# Patient Record
Sex: FEMALE | Race: WHITE | NOT HISPANIC OR LATINO | Employment: OTHER | URBAN - METROPOLITAN AREA
[De-identification: names, ages, dates, MRNs, and addresses within clinical notes are randomized per-mention and may not be internally consistent; named-entity substitution may affect disease eponyms.]

---

## 2024-03-19 ENCOUNTER — APPOINTMENT (OUTPATIENT)
Dept: RADIOLOGY | Facility: HOSPITAL | Age: 54
End: 2024-03-19
Payer: COMMERCIAL

## 2024-03-28 ENCOUNTER — HOSPITAL ENCOUNTER (OUTPATIENT)
Dept: RADIOLOGY | Facility: HOSPITAL | Age: 54
Discharge: HOME | End: 2024-03-28
Payer: COMMERCIAL

## 2024-03-28 ENCOUNTER — HOSPITAL ENCOUNTER (EMERGENCY)
Facility: HOSPITAL | Age: 54
Discharge: HOME | End: 2024-03-29
Attending: STUDENT IN AN ORGANIZED HEALTH CARE EDUCATION/TRAINING PROGRAM
Payer: COMMERCIAL

## 2024-03-28 VITALS
SYSTOLIC BLOOD PRESSURE: 159 MMHG | OXYGEN SATURATION: 98 % | RESPIRATION RATE: 16 BRPM | DIASTOLIC BLOOD PRESSURE: 87 MMHG | TEMPERATURE: 98.4 F | HEART RATE: 80 BPM

## 2024-03-28 DIAGNOSIS — M54.12 RADICULOPATHY, CERVICAL REGION: ICD-10-CM

## 2024-03-28 DIAGNOSIS — M50.30 OTHER CERVICAL DISC DEGENERATION, UNSPECIFIED CERVICAL REGION: ICD-10-CM

## 2024-03-28 DIAGNOSIS — R10.9 ABDOMINAL PAIN, UNSPECIFIED ABDOMINAL LOCATION: Primary | ICD-10-CM

## 2024-03-28 LAB
ALBUMIN SERPL BCP-MCNC: 5.2 G/DL (ref 3.4–5)
ALP SERPL-CCNC: 64 U/L (ref 33–110)
ALT SERPL W P-5'-P-CCNC: 49 U/L (ref 7–45)
ANION GAP BLDV CALCULATED.4IONS-SCNC: 14 MMOL/L (ref 10–25)
ANION GAP SERPL CALC-SCNC: 15 MMOL/L (ref 10–20)
AST SERPL W P-5'-P-CCNC: 26 U/L (ref 9–39)
BASE EXCESS BLDV CALC-SCNC: 1.7 MMOL/L (ref -2–3)
BASOPHILS # BLD AUTO: 0.04 X10*3/UL (ref 0–0.1)
BASOPHILS NFR BLD AUTO: 0.4 %
BILIRUB SERPL-MCNC: 0.6 MG/DL (ref 0–1.2)
BODY TEMPERATURE: 37 DEGREES CELSIUS
BUN SERPL-MCNC: 11 MG/DL (ref 6–23)
CA-I BLDV-SCNC: 1.25 MMOL/L (ref 1.1–1.33)
CALCIUM SERPL-MCNC: 10.1 MG/DL (ref 8.6–10.6)
CHLORIDE BLDV-SCNC: 103 MMOL/L (ref 98–107)
CHLORIDE SERPL-SCNC: 104 MMOL/L (ref 98–107)
CO2 SERPL-SCNC: 26 MMOL/L (ref 21–32)
CREAT SERPL-MCNC: 0.59 MG/DL (ref 0.5–1.05)
EGFRCR SERPLBLD CKD-EPI 2021: >90 ML/MIN/1.73M*2
EOSINOPHIL # BLD AUTO: 0.26 X10*3/UL (ref 0–0.7)
EOSINOPHIL NFR BLD AUTO: 2.7 %
ERYTHROCYTE [DISTWIDTH] IN BLOOD BY AUTOMATED COUNT: 13 % (ref 11.5–14.5)
GLUCOSE BLDV-MCNC: 95 MG/DL (ref 74–99)
GLUCOSE SERPL-MCNC: 93 MG/DL (ref 74–99)
HCO3 BLDV-SCNC: 27.2 MMOL/L (ref 22–26)
HCT VFR BLD AUTO: 38.6 % (ref 36–46)
HCT VFR BLD EST: 41 % (ref 36–46)
HGB BLD-MCNC: 13.1 G/DL (ref 12–16)
HGB BLDV-MCNC: 13.6 G/DL (ref 12–16)
IMM GRANULOCYTES # BLD AUTO: 0.02 X10*3/UL (ref 0–0.7)
IMM GRANULOCYTES NFR BLD AUTO: 0.2 % (ref 0–0.9)
INHALED O2 CONCENTRATION: 21 %
LACTATE BLDV-SCNC: 2.2 MMOL/L (ref 0.4–2)
LIPASE SERPL-CCNC: 48 U/L (ref 9–82)
LYMPHOCYTES # BLD AUTO: 4.42 X10*3/UL (ref 1.2–4.8)
LYMPHOCYTES NFR BLD AUTO: 46.5 %
MCH RBC QN AUTO: 29.2 PG (ref 26–34)
MCHC RBC AUTO-ENTMCNC: 33.9 G/DL (ref 32–36)
MCV RBC AUTO: 86 FL (ref 80–100)
MONOCYTES # BLD AUTO: 0.55 X10*3/UL (ref 0.1–1)
MONOCYTES NFR BLD AUTO: 5.8 %
NEUTROPHILS # BLD AUTO: 4.21 X10*3/UL (ref 1.2–7.7)
NEUTROPHILS NFR BLD AUTO: 44.4 %
NRBC BLD-RTO: 0 /100 WBCS (ref 0–0)
OXYHGB MFR BLDV: 52.3 % (ref 45–75)
PCO2 BLDV: 45 MM HG (ref 41–51)
PH BLDV: 7.39 PH (ref 7.33–7.43)
PLATELET # BLD AUTO: 300 X10*3/UL (ref 150–450)
PO2 BLDV: 37 MM HG (ref 35–45)
POTASSIUM BLDV-SCNC: 3.3 MMOL/L (ref 3.5–5.3)
POTASSIUM SERPL-SCNC: 3.2 MMOL/L (ref 3.5–5.3)
PROT SERPL-MCNC: 8.1 G/DL (ref 6.4–8.2)
RBC # BLD AUTO: 4.49 X10*6/UL (ref 4–5.2)
SAO2 % BLDV: 53 % (ref 45–75)
SODIUM BLDV-SCNC: 141 MMOL/L (ref 136–145)
SODIUM SERPL-SCNC: 142 MMOL/L (ref 136–145)
WBC # BLD AUTO: 9.5 X10*3/UL (ref 4.4–11.3)

## 2024-03-28 PROCEDURE — 2500000004 HC RX 250 GENERAL PHARMACY W/ HCPCS (ALT 636 FOR OP/ED): Performed by: STUDENT IN AN ORGANIZED HEALTH CARE EDUCATION/TRAINING PROGRAM

## 2024-03-28 PROCEDURE — 96375 TX/PRO/DX INJ NEW DRUG ADDON: CPT

## 2024-03-28 PROCEDURE — 81001 URINALYSIS AUTO W/SCOPE: CPT | Performed by: STUDENT IN AN ORGANIZED HEALTH CARE EDUCATION/TRAINING PROGRAM

## 2024-03-28 PROCEDURE — 85025 COMPLETE CBC W/AUTO DIFF WBC: CPT | Performed by: STUDENT IN AN ORGANIZED HEALTH CARE EDUCATION/TRAINING PROGRAM

## 2024-03-28 PROCEDURE — 2500000001 HC RX 250 WO HCPCS SELF ADMINISTERED DRUGS (ALT 637 FOR MEDICARE OP): Performed by: STUDENT IN AN ORGANIZED HEALTH CARE EDUCATION/TRAINING PROGRAM

## 2024-03-28 PROCEDURE — 72141 MRI NECK SPINE W/O DYE: CPT

## 2024-03-28 PROCEDURE — 96374 THER/PROPH/DIAG INJ IV PUSH: CPT

## 2024-03-28 PROCEDURE — 84132 ASSAY OF SERUM POTASSIUM: CPT | Performed by: STUDENT IN AN ORGANIZED HEALTH CARE EDUCATION/TRAINING PROGRAM

## 2024-03-28 PROCEDURE — 99285 EMERGENCY DEPT VISIT HI MDM: CPT | Performed by: STUDENT IN AN ORGANIZED HEALTH CARE EDUCATION/TRAINING PROGRAM

## 2024-03-28 PROCEDURE — 36415 COLL VENOUS BLD VENIPUNCTURE: CPT | Performed by: STUDENT IN AN ORGANIZED HEALTH CARE EDUCATION/TRAINING PROGRAM

## 2024-03-28 PROCEDURE — 72141 MRI NECK SPINE W/O DYE: CPT | Performed by: RADIOLOGY

## 2024-03-28 PROCEDURE — 83690 ASSAY OF LIPASE: CPT | Performed by: STUDENT IN AN ORGANIZED HEALTH CARE EDUCATION/TRAINING PROGRAM

## 2024-03-28 PROCEDURE — 99284 EMERGENCY DEPT VISIT MOD MDM: CPT

## 2024-03-28 RX ORDER — ALUMINUM HYDROXIDE, MAGNESIUM HYDROXIDE, AND SIMETHICONE 1200; 120; 1200 MG/30ML; MG/30ML; MG/30ML
30 SUSPENSION ORAL ONCE
Status: COMPLETED | OUTPATIENT
Start: 2024-03-28 | End: 2024-03-28

## 2024-03-28 RX ORDER — METHOCARBAMOL 100 MG/ML
1000 INJECTION, SOLUTION INTRAMUSCULAR; INTRAVENOUS ONCE
Status: COMPLETED | OUTPATIENT
Start: 2024-03-28 | End: 2024-03-28

## 2024-03-28 RX ORDER — MORPHINE SULFATE 4 MG/ML
4 INJECTION INTRAVENOUS ONCE
Status: COMPLETED | OUTPATIENT
Start: 2024-03-28 | End: 2024-03-28

## 2024-03-28 RX ORDER — LIDOCAINE HYDROCHLORIDE 20 MG/ML
15 SOLUTION OROPHARYNGEAL ONCE
Status: COMPLETED | OUTPATIENT
Start: 2024-03-28 | End: 2024-03-28

## 2024-03-28 RX ORDER — FAMOTIDINE 10 MG/ML
20 INJECTION INTRAVENOUS ONCE
Status: COMPLETED | OUTPATIENT
Start: 2024-03-28 | End: 2024-03-28

## 2024-03-28 RX ORDER — ONDANSETRON HYDROCHLORIDE 2 MG/ML
4 INJECTION, SOLUTION INTRAVENOUS ONCE
Status: COMPLETED | OUTPATIENT
Start: 2024-03-28 | End: 2024-03-28

## 2024-03-28 RX ORDER — DIPHENHYDRAMINE HYDROCHLORIDE 50 MG/ML
50 INJECTION INTRAMUSCULAR; INTRAVENOUS ONCE
Status: COMPLETED | OUTPATIENT
Start: 2024-03-29 | End: 2024-03-29

## 2024-03-28 RX ADMIN — LIDOCAINE HYDROCHLORIDE 15 ML: 20 SOLUTION ORAL at 23:07

## 2024-03-28 RX ADMIN — ALUMINUM HYDROXIDE, MAGNESIUM HYDROXIDE, AND DIMETHICONE 30 ML: 200; 20; 200 SUSPENSION ORAL at 23:07

## 2024-03-28 RX ADMIN — MORPHINE SULFATE 4 MG: 4 INJECTION, SOLUTION INTRAMUSCULAR; INTRAVENOUS at 23:08

## 2024-03-28 RX ADMIN — ONDANSETRON 4 MG: 2 INJECTION INTRAMUSCULAR; INTRAVENOUS at 23:08

## 2024-03-28 RX ADMIN — METHYLPREDNISOLONE SODIUM SUCCINATE 40 MG: 40 INJECTION, POWDER, FOR SOLUTION INTRAMUSCULAR; INTRAVENOUS at 23:08

## 2024-03-28 RX ADMIN — FAMOTIDINE 20 MG: 10 INJECTION INTRAVENOUS at 23:08

## 2024-03-28 RX ADMIN — METHOCARBAMOL 1000 MG: 1000 INJECTION, SOLUTION INTRAMUSCULAR; INTRAVENOUS at 23:07

## 2024-03-28 ASSESSMENT — COLUMBIA-SUICIDE SEVERITY RATING SCALE - C-SSRS
2. HAVE YOU ACTUALLY HAD ANY THOUGHTS OF KILLING YOURSELF?: NO
6. HAVE YOU EVER DONE ANYTHING, STARTED TO DO ANYTHING, OR PREPARED TO DO ANYTHING TO END YOUR LIFE?: NO
1. IN THE PAST MONTH, HAVE YOU WISHED YOU WERE DEAD OR WISHED YOU COULD GO TO SLEEP AND NOT WAKE UP?: NO

## 2024-03-28 NOTE — ED TRIAGE NOTES
Pt says she's had ongoing neck and back pain for months, had MRI outpt today, says feels like muscle cramping.

## 2024-03-29 ENCOUNTER — APPOINTMENT (OUTPATIENT)
Dept: RADIOLOGY | Facility: HOSPITAL | Age: 54
End: 2024-03-29
Payer: COMMERCIAL

## 2024-03-29 LAB
APPEARANCE UR: CLEAR
BILIRUB UR STRIP.AUTO-MCNC: NEGATIVE MG/DL
COLOR UR: ABNORMAL
GLUCOSE UR STRIP.AUTO-MCNC: NORMAL MG/DL
HOLD SPECIMEN: NORMAL
KETONES UR STRIP.AUTO-MCNC: NEGATIVE MG/DL
LEUKOCYTE ESTERASE UR QL STRIP.AUTO: ABNORMAL
MUCOUS THREADS #/AREA URNS AUTO: NORMAL /LPF
NITRITE UR QL STRIP.AUTO: NEGATIVE
PH UR STRIP.AUTO: 5.5 [PH]
PROT UR STRIP.AUTO-MCNC: NEGATIVE MG/DL
RBC # UR STRIP.AUTO: NEGATIVE /UL
RBC #/AREA URNS AUTO: NORMAL /HPF
SP GR UR STRIP.AUTO: 1.02
SQUAMOUS #/AREA URNS AUTO: NORMAL /HPF
UROBILINOGEN UR STRIP.AUTO-MCNC: NORMAL MG/DL
WBC #/AREA URNS AUTO: NORMAL /HPF

## 2024-03-29 PROCEDURE — 2500000005 HC RX 250 GENERAL PHARMACY W/O HCPCS: Performed by: STUDENT IN AN ORGANIZED HEALTH CARE EDUCATION/TRAINING PROGRAM

## 2024-03-29 PROCEDURE — 96376 TX/PRO/DX INJ SAME DRUG ADON: CPT

## 2024-03-29 PROCEDURE — 2550000001 HC RX 255 CONTRASTS: Performed by: EMERGENCY MEDICINE

## 2024-03-29 PROCEDURE — 2500000001 HC RX 250 WO HCPCS SELF ADMINISTERED DRUGS (ALT 637 FOR MEDICARE OP): Performed by: STUDENT IN AN ORGANIZED HEALTH CARE EDUCATION/TRAINING PROGRAM

## 2024-03-29 PROCEDURE — 74177 CT ABD & PELVIS W/CONTRAST: CPT

## 2024-03-29 PROCEDURE — 96375 TX/PRO/DX INJ NEW DRUG ADDON: CPT

## 2024-03-29 PROCEDURE — 74177 CT ABD & PELVIS W/CONTRAST: CPT | Performed by: STUDENT IN AN ORGANIZED HEALTH CARE EDUCATION/TRAINING PROGRAM

## 2024-03-29 PROCEDURE — 2500000004 HC RX 250 GENERAL PHARMACY W/ HCPCS (ALT 636 FOR OP/ED): Performed by: STUDENT IN AN ORGANIZED HEALTH CARE EDUCATION/TRAINING PROGRAM

## 2024-03-29 RX ORDER — POTASSIUM CHLORIDE 1.5 G/1.58G
40 POWDER, FOR SOLUTION ORAL ONCE
Status: COMPLETED | OUTPATIENT
Start: 2024-03-29 | End: 2024-03-29

## 2024-03-29 RX ORDER — ONDANSETRON 4 MG/1
4 TABLET, ORALLY DISINTEGRATING ORAL ONCE
Status: COMPLETED | OUTPATIENT
Start: 2024-03-29 | End: 2024-03-29

## 2024-03-29 RX ADMIN — ONDANSETRON 4 MG: 4 TABLET, ORALLY DISINTEGRATING ORAL at 03:00

## 2024-03-29 RX ADMIN — METHYLPREDNISOLONE SODIUM SUCCINATE 40 MG: 40 INJECTION, POWDER, FOR SOLUTION INTRAMUSCULAR; INTRAVENOUS at 02:49

## 2024-03-29 RX ADMIN — IOHEXOL 80 ML: 350 INJECTION, SOLUTION INTRAVENOUS at 04:03

## 2024-03-29 RX ADMIN — POTASSIUM CHLORIDE 40 MEQ: 1.5 POWDER, FOR SOLUTION ORAL at 02:48

## 2024-03-29 RX ADMIN — DIPHENHYDRAMINE HYDROCHLORIDE 50 MG: 50 INJECTION INTRAMUSCULAR; INTRAVENOUS at 02:49

## 2024-03-29 NOTE — ED PROVIDER NOTES
HPI   Chief Complaint   Patient presents with    Back Pain       HPI  The patient is a 53-year-old female with past medical history of chronic back and abdominal pain with known ventral hernia status post mesh repair with central defect who presents emergency department with multiple medical complaints.  First, patient has had worsening generalized abdominal pain, distention and nausea and vomiting over the last week or 2.  Vomit is nonbloody nonbilious.  She is passing gas and having bowel movements.  Additionally, patient has chronic neck and back pain.  No fevers, saddle anesthesia or urinary incontinence/retention.  No trauma.  No spinal manipulation recently or use of anticoagulation.  No IV drug use.  States that she has pain when walking and mild weakness due to pain but is still walking normally.  She underwent outpatient MRI of the cervical spine that has not been read yet.      PMH:as above.  Meds:reviewed in EMR.  PSH:reviewed in EMR.  allergies:reviewed in EMR.  social:Denies X3.  Family History: non-contributory to acute presentation.    A full 10 point Review of Systems was reviewed with the patient and is negative unless stated in the HPI.                  No data recorded                   Patient History   History reviewed. No pertinent past medical history.  Past Surgical History:   Procedure Laterality Date    OTHER SURGICAL HISTORY  02/23/2021    Gallbladder surgery     No family history on file.  Social History     Tobacco Use    Smoking status: Not on file    Smokeless tobacco: Not on file   Substance Use Topics    Alcohol use: Not on file    Drug use: Not on file       Physical Exam   ED Triage Vitals [03/28/24 1837]   Temperature Heart Rate Respirations BP   36.9 °C (98.4 °F) 80 16 159/87      Pulse Ox Temp Source Heart Rate Source Patient Position   98 % Temporal -- --      BP Location FiO2 (%)     -- --       Physical Exam    Physical Exam:    Appearance: Appears uncomfortable    Skin:  Intact,  dry skin, no lesions, rash, petechiae or purpura.     Eyes: PERRLA, EOMs intact,  No scleral injection. No scleral icterus.     ENT: Hearing grossly intact. External auditory canals patent. Nares patent, mucus membranes moist. Dentition without lesions.     Neck: Supple, without meningismus. Trachea at midline.     Pulmonary: Clear bilaterally with good chest wall excursion. No rales, rhonchi or wheezing. No accessory muscle use or stridor.    Cardiac: Normal S1, S2 without murmur, rub, gallop or extrasystole. No JVD, Carotids without bruits.    Abdomen: Abdomen soft however distended with generalized tenderness to palpation.  No rebound tenderness.  No CVA tenderness    Genitourinary: Exam deferred.    Musculoskeletal: There is no midline C, T or L-spine tenderness to palpation, step-offs or deformities.  There is left lumbar paraspinal muscle tenderness to palpation and spasming.  Straight leg test negative bilaterally.  Strength is 5 out of 5 and symmetric in bilateral upper and lower extremities.  Sensation light touch intact distally in all 4 extremities.    Neurological:  Moving all 4 extremities equally, no focal findings identified    Psychiatric: Appropriate mood and affect.     ED Course & MDM   Diagnoses as of 03/31/24 0552   Abdominal pain, unspecified abdominal location       Medical Decision Making  Patient is a 53-year-old female past medical history of ventral hernia repair with central failure of her mesh who presents emergency department with acute on chronic worsening of abdominal pain, nausea, vomiting and lower back pain due to muscle spasms.  She was hemodynamically stable apart from hypertension on arrival and afebrile.  She had soft abdomen however she had generalized abdominal tenderness and some distention.  Due to her significant surgical history and reported vomiting we ordered a CT scan of the abdomen pelvis IV contrast and labs.  Her labs were large without abnormality.  Also  sent urine.      Treated the patient with pain medication, antinausea medication and antispasmodics due to the fact that she had some left paraspinal muscle spasming and was describing symptoms consistent with muscle spasms.  Patient was signed out to incoming team providers pending CT scan as she needed contrast prep due to contrast allergy.  She was in stable condition at the time.      This patient was discussed with Dr. Rincon who agrees.      Enrique Jacques MD  Emergency Medicine, PGY3    Procedure  Procedures  ATTENDING NOTE for Moe Rincon MD:    ATTENDING ATTESTATION:  The patient was seen by the resident/fellow.  I have personally performed a substantive portion of the encounter.  I have seen and examined the patient; agree with the workup, evaluation, MDM, management and diagnosis.  The care plan has been discussed with the resident/fellow; I have reviewed the resident/fellow´s note and agree with the documented findings with the exception/addition of the following:    Patient is a 53-year-old with history of chronic back pain and abdominal pain with known ventral hernia who had mesh placed but it was complicated by defect presents with persistence of her diffuse abdominal pain and back pain.  She reports her back feels like it is spasming but denies any new numbness Jackson weakness inability urinate or defecate saddle anesthesia or IV drug use or fevers or chills.  She had a recent MRI of her C-spine earlier today without a read but when I reviewed I do not see any definitive evidence of cord compression syndrome.  I do not think there is an indication for T or L-spine MRI given she has no symptoms of cauda equina syndrome my suspicion for discitis epidural abscess and osteomyelitis of the vertebral column is low especially given the chronicity of her symptoms and lack of risk factors.  We did treat her with muscle relaxer to help her symptoms.  Her belly exam is benign but given her previous  procedures, we did want a CT to investigate further.  Blood work was also obtained.  It was overall reassuring aside from mild hypokalemia.  Will replace the potassium here.  Urine not consistent with UTI.  Final disposition is pending repeat evaluation after the CT.    ---------------------------------------------------------       Kevin Jacques MD  Resident  03/31/24 0556

## 2024-03-29 NOTE — PROGRESS NOTES
Patient was signed out to me by Dr. Jacques at approximately 0200. For full history, physical, and prior ED course, please see previous provider note prior to patient handoff. This is an addendum to the record.     MDM:  Kacie Antoine is a 53 y.o. female presenting to the ED due to back pain and abdominal pain.  She has a history of chronic back pain as well as a known ventral hernia with failure to repair.  At time of signout, the patient was pending CT scans.  Due to contrast allergy, 5-hour prep was initiated and pending completion prior to CTs.  During my shift, CT was done.  Personally reviewed the CT scan which did not show any acute pathology.  However, patient was adamant on leaving as she was dissatisfied with her experience in the emergency room.  It was explained to the patient the importance of staying for radiologist to read the CT however she was insistent that she would follow-up on the results from [facility] and return should a positive result come back.  As such, patient was discharged with return precautions.  Amenable to this plan.    Final diagnoses:   [R10.9] Abdominal pain, unspecified abdominal location       Disposition:  Discharge    Mira Varela MD   Emergency Medicine Resident, PGY3  [facility]    ED Course:  Diagnoses as of 03/30/24 2127   Abdominal pain, unspecified abdominal location       Procedure  Procedures

## 2024-04-01 ENCOUNTER — OFFICE VISIT (OUTPATIENT)
Dept: SURGERY | Facility: CLINIC | Age: 54
End: 2024-04-01
Payer: COMMERCIAL

## 2024-04-01 VITALS
HEIGHT: 64 IN | BODY MASS INDEX: 32.3 KG/M2 | SYSTOLIC BLOOD PRESSURE: 132 MMHG | WEIGHT: 189.2 LBS | DIASTOLIC BLOOD PRESSURE: 89 MMHG | OXYGEN SATURATION: 96 % | HEART RATE: 101 BPM

## 2024-04-01 DIAGNOSIS — R10.9 ABDOMINAL PAIN, UNSPECIFIED ABDOMINAL LOCATION: Primary | ICD-10-CM

## 2024-04-01 PROCEDURE — 99202 OFFICE O/P NEW SF 15 MIN: CPT | Performed by: SURGERY

## 2024-04-01 RX ORDER — METHOCARBAMOL 500 MG/1
TABLET, FILM COATED ORAL EVERY 6 HOURS
COMMUNITY
Start: 2024-03-29

## 2024-04-01 NOTE — PROGRESS NOTES
"Subjective   Patient ID: Kacie Antoine is a 53 y.o. female who presents for New Patient Visit (Patient is a self referral for possible hernia. Patient states that she has had 3 previous hernia surgeries. Patient states that she has burning, pulling, pushing, swelling. Patient states that when she walks she feels pulling. Patient states that she has been hurting on her left side. Patient has been having diarrhea and constipation. Patient states that she gets stuck in certain positions at times. Patient states that the cramping she gets has labored her breathing. ).    Chief Complaint   Patient presents with    New Patient Visit     Patient is a self referral for possible hernia. Patient states that she has had 3 previous hernia surgeries. Patient states that she has burning, pulling, pushing, swelling. Patient states that when she walks she feels pulling. Patient states that she has been hurting on her left side. Patient has been having diarrhea and constipation. Patient states that she gets stuck in certain positions at times. Patient states that the cramping she gets has labored her breathing.        History of Presenting Illness:  53F with history significant for previous ventral hernia repairs presents with bilateral abdominal wall \"pulling sensation\" progressively worsening for the past 3 months.  Discomfort was previously on the R side but is now bilaterally.  Patient also reports problems between intermittent constipation and intermittent diarrhoea where she can go 3x times in a day with loose bowel movements - she states that she last had a colonoscopy in 2019 where there were polyps removed which were benign.    Her history is noted for having undergone an open abdominal wall reconstruction with mesh and bilateral posterior component separation and a FB (mesh+suture from previous repairs) removal performed by Dr.Ryan Velásquez in 2019.  Prior to that, she had two previous attempts at abdominal wall " reconstructions with transverses abdominis muscle release.  She had been doing well since the surgery and the present issues had only got worse in the last 3 months.      Review of Systems:  Constitutional: Denies changes in weight, fatigue, night-sweats  HEENT: No changes in vision, nasal drainage, headache  CV: No palpitations, no chest pain, no lower extremity oedema  Resp: No wheezing, no cough, no shortness of breath  GI: No nausea, vomiting, +diarrhoea, +constipation  : No dysuria, no increased frequency  Neuro: No weakness, confusion, numbness, dizziness  MSK: No weakness, arthralgias, myalgias  Haem: No easy bruising, no easy bleeding  Skin: No new lesions or rashes  Endocrine: No polydipsia, polyuria, heat/cold insensitivity    History reviewed. No pertinent past medical history.    Past Surgical History:   Procedure Laterality Date    EXPLORATORY LAPAROTOMY      OTHER SURGICAL HISTORY  02/23/2021    Gallbladder surgery    VENTRAL HERNIA REPAIR         Current Outpatient Medications on File Prior to Visit   Medication Sig Dispense Refill    methocarbamol (Robaxin) 500 mg tablet every 6 hours.       No current facility-administered medications on file prior to visit.        Allergies   Allergen Reactions    Hydromorphone Itching and Swelling    Sulfamethoxazole Unknown    Codeine Rash    Erythromycin Rash     Stomach pain    Iodinated Contrast Media Rash    Iodine Rash    Penicillins Rash and Unknown    Shellfish Derived Rash       Social History     Socioeconomic History    Marital status: Single     Spouse name: Not on file    Number of children: Not on file    Years of education: Not on file    Highest education level: Not on file   Occupational History    Not on file   Tobacco Use    Smoking status: Never    Smokeless tobacco: Never   Substance and Sexual Activity    Alcohol use: Never    Drug use: Never    Sexual activity: Not on file   Other Topics Concern    Not on file   Social History Narrative     "Not on file     Social Determinants of Health     Financial Resource Strain: Not on file   Food Insecurity: Not on file   Transportation Needs: Not on file   Physical Activity: Not on file   Stress: Not on file   Social Connections: Not on file   Intimate Partner Violence: Not on file   Housing Stability: Not on file       No family history on file.    Objective   /89   Pulse 101   Ht 1.626 m (5' 4\")   Wt 85.8 kg (189 lb 3.2 oz)   SpO2 96%   BMI 32.48 kg/m²     Physical Exam  Vitals reviewed.   Abdominal:      General: Abdomen is flat.      Palpations: Abdomen is soft.      Comments: No discernible recurrence of hernia(s) on abdominal exam  Well-healed surgical scars  Discomfort on deep palpation of bilateral abdomen, no guarding, no rebound   Musculoskeletal:         General: Normal range of motion.   Skin:     General: Skin is warm.      Capillary Refill: Capillary refill takes less than 2 seconds.   Neurological:      General: No focal deficit present.      Mental Status: She is alert.   Psychiatric:         Mood and Affect: Mood normal.       CT abdomen pelvis w IV contrast 03/29/2024    Narrative  Interpreted By:  Matt Troy and Stephens Katherine  STUDY:  CT ABDOMEN PELVIS W IV CONTRAST;  3/29/2024 3:59 am    INDICATION:  Signs/Symptoms:has ventral opening in mesh after repair of ventral  hernia, worsening abd pain, N&V.    COMPARISON:  CT abdomen pelvis 02/15/2021    ACCESSION NUMBER(S):  [id number]    ORDERING CLINICIAN:  RASHID BECK    TECHNIQUE:  CT of the abdomen and pelvis was performed.  Standard contiguous  axial images were obtained at 3 mm slice thickness through the  abdomen and pelvis. Coronal and sagittal reconstructions at 3 mm  slice thickness were performed.    80 ml of contrast Omnipaque 350 were administered intravenously  without immediate complication.    FINDINGS:  LOWER CHEST:  The visualized lung base is unremarkable. The heart is normal in size  without " pericardial effusion. No pleural effusion is present.  Visualized distal esophagus appears normal.    ABDOMEN:    LIVER:  The liver is enlarged and demonstrates diffusely decreased  attenuation consistent with hepatic steatosis. The liver is normal in  contour.    BILE DUCTS:  Intrahepatic and extrahepatic bile ducts are prominent which is  within normal limits given the status post cholecystectomy.    GALLBLADDER:  The gallbladder is surgically absent.    PANCREAS:  The pancreas appears unremarkable without evidence of ductal  dilatation or masses.    SPLEEN:  The spleen is enlarg within upper normal limits measuring 12.5 cm  craniocaudally similar to prior.    ADRENAL GLANDS:  Bilateral adrenal glands appear normal.    KIDNEYS AND URETERS:  The kidneys are normal in size and enhance symmetrically.  No  hydroureteronephrosis or nephroureterolithiasis is identified.    PELVIS:    BLADDER:  The urinary bladder appears normal without abnormal wall thickening.    REPRODUCTIVE ORGANS:  The uterus is present.    BOWEL:  The stomach is unremarkable.   The small and large bowel are normal  in caliber and demonstrate no wall thickening.   The appendix is  elongated along and extends anterior to the right hepatic lobe,  however it is normal. There is moderate amount of formed stool  throughout the colon without wall thickening or acute inflammatory  change. VESSELS:  There is no aneurysmal dilatation of the abdominal aorta. The IVC  appears normal.    PERITONEUM/RETROPERITONEUM/LYMPH NODES:  No ascites or free air, no fluid collection.  No abdominopelvic  lymphadenopathy is present.    BONES AND ABDOMINAL WALL:  No suspicious osseous lesions are identified. Degenerative discogenic  disease is noted in the lower thoracic and lumbar spine.  Postsurgical changes are noted of the ventral abdominal wall from  interval ventral abdominal wall hernia repair. There is ventral  abdominal wall scarring without evidence of recurrent  "hernia.    Impression  1.  No etiology of abdominal pain is evident.  2. Hepatomegaly with diffuse hepatic steatosis.  3. Postsurgical changes from ventral abdominal wall hernia repair. No  evidence of recurrent  hernia.    I personally reviewed the images/study and I agree with Emily Mcqueen DO's (radiology resident) findings as stated. This study  was interpreted at [place], [place].    MACRO:  None    Signed by: Matt Troy 3/29/2024 5:24 AM  Dictation workstation:   [id number]    Assessment/Plan   53F with history of previous abdominal wall reconstructions for VH with no discernible signs of recurrence on exam nor imaging but with bilateral \"tugging\" sensation which would most likely be secondary to the repair - no urgent intervention required at this time but will defer to hernia specialist.    - will refer to  at [facility]  Problem List Items Addressed This Visit    None  Visit Diagnoses         Codes    Abdominal pain, unspecified abdominal location    -  Primary R10.9    Relevant Orders    Referral to General Surgery                 Conrad Domínguez MD 04/01/24 9:54 AM   "

## 2024-04-03 ENCOUNTER — APPOINTMENT (OUTPATIENT)
Dept: RADIOLOGY | Facility: HOSPITAL | Age: 54
End: 2024-04-03
Payer: COMMERCIAL

## 2024-04-03 ENCOUNTER — TELEPHONE (OUTPATIENT)
Dept: SURGERY | Facility: CLINIC | Age: 54
End: 2024-04-03
Payer: COMMERCIAL

## 2024-04-03 NOTE — TELEPHONE ENCOUNTER
Patient called to get referral to a gastroenterologist since she's new to [facility]. She also inquires if  [facility]  accept Medical; [employer]?

## 2024-04-08 ENCOUNTER — OFFICE VISIT (OUTPATIENT)
Dept: ORTHOPEDIC SURGERY | Facility: CLINIC | Age: 54
End: 2024-04-08
Payer: COMMERCIAL

## 2024-04-08 VITALS — WEIGHT: 189 LBS | BODY MASS INDEX: 32.27 KG/M2 | HEIGHT: 64 IN

## 2024-04-08 DIAGNOSIS — M54.50 CHRONIC MIDLINE LOW BACK PAIN WITHOUT SCIATICA: Primary | ICD-10-CM

## 2024-04-08 DIAGNOSIS — M48.02 SPINAL STENOSIS OF CERVICAL REGION: ICD-10-CM

## 2024-04-08 DIAGNOSIS — G89.29 CHRONIC MIDLINE LOW BACK PAIN WITHOUT SCIATICA: Primary | ICD-10-CM

## 2024-04-08 PROCEDURE — 99204 OFFICE O/P NEW MOD 45 MIN: CPT | Performed by: PHYSICIAN ASSISTANT

## 2024-04-08 ASSESSMENT — PAIN - FUNCTIONAL ASSESSMENT: PAIN_FUNCTIONAL_ASSESSMENT: 0-10

## 2024-04-08 ASSESSMENT — PAIN DESCRIPTION - DESCRIPTORS: DESCRIPTORS: CRAMPING

## 2024-04-10 NOTE — PROGRESS NOTES
Kacie is a 53-year-old female reporting clinic today for evaluation of her neck and low back pain.    Her symptoms started approximately 3 months ago, she denies injury or trauma.  Her neck pain bothers her more than her back.  She has midline neck pain that radiates down her arms bilaterally.  She also feels her arms are weak.  She does have midline low back pain without radiation, specifically down her legs.  No recent change in her balance or fine motor skills.  She does admit to dropping things frequently.  No recent change in her bowel or bladder function.    She understands that some of her back pain could be coming from her weakened core, she has a complete abdominal mesh that was placed 2 years ago she basically has no abdominal muscles left.    Family, social, and medical histories are obtained and reviewed.    ROS: All other systems have been reviewed and are negative except as previously noted in history of present illness.    Physical Exam:  Const: Well-appearing, well-nourished female in no distress.  Eyes: Normal appearing sclera and conjunctiva, no jaundice, pupils normal in appearance.  Neck: No masses or lymphadenopathy appreciated.  Resp: breathing comfortably, normal respiratory rate rate.  CV: No upper or lower extremity edema.  Musculoskeletal: Normal gait.  Able to heel/toe walk without difficulty.  Cervical ROM is supple.  Strength exam of the upper and lower extremities reveals 5/5 strength in all major muscle groups.  No intrinsic wasting.  Negative Spurling sign.    Neuro: Sensation is intact and equal bilaterally. Deep tendon reflexes are normal and symmetric.  No clonus, positive Jamil sign, negative Lhermitte's sign  Skin: Intact without any lesions, normal turgor.  Psych: Alert and oriented x3, normal mood and affect.    I personally reviewed a MRI of the cervical spine completed on 3/28.  There is reversal of the normal cervical lordosis.  There is mild central canal stenosis at  C6-7 with severe bilateral foraminal stenosis.  There is significant foraminal stenosis bilaterally at C5-6 as well.    I had a long discussion with her about her symptoms and imaging results.  In regards to her low back pain without radiculopathy I recommend conservative treatment with aquatic therapy.  As for her neck pain with bilateral radiculopathy I do think she would be a candidate for surgical intervention.  She has not currently tried any conservative treatment.  She was given a referral for physical therapy and pain management to discuss possible injections.  If she does not have improvement after injection she can follow-up with myself or Dr. Leblanc to further discuss surgical intervention.    **This note was dictated using speech recognition software and was not corrected for spelling or grammatical errors**

## 2024-04-17 ENCOUNTER — APPOINTMENT (OUTPATIENT)
Dept: PAIN MEDICINE | Facility: CLINIC | Age: 54
End: 2024-04-17
Payer: COMMERCIAL

## 2024-04-23 ENCOUNTER — APPOINTMENT (OUTPATIENT)
Dept: SURGERY | Facility: HOSPITAL | Age: 54
End: 2024-04-23
Payer: COMMERCIAL

## 2024-04-24 ENCOUNTER — OFFICE VISIT (OUTPATIENT)
Dept: PAIN MEDICINE | Facility: CLINIC | Age: 54
End: 2024-04-24
Payer: COMMERCIAL

## 2024-04-24 DIAGNOSIS — M79.18 MYOFASCIAL PAIN: ICD-10-CM

## 2024-04-24 DIAGNOSIS — M54.12 BRACHIAL RADICULITIS: ICD-10-CM

## 2024-04-24 DIAGNOSIS — M47.812 SPONDYLOSIS OF CERVICAL REGION WITHOUT MYELOPATHY OR RADICULOPATHY: Primary | ICD-10-CM

## 2024-04-24 PROCEDURE — 99204 OFFICE O/P NEW MOD 45 MIN: CPT | Performed by: PHYSICAL MEDICINE & REHABILITATION

## 2024-04-24 NOTE — PROGRESS NOTES
Chief complaint  Neck and back pain     History  Kacie Antoine is presenting  for initial pain management office visit  The neck pain is worse. The back pain is in the back pain   Neck pain is the worst. That is radiating to the elbows and radial forearms bilaterally  Pain worse on the R side. The pain at the base of the neck is associated with deep stabbing sensation along with tight muscles limiting the range of motion of the neck mainly in flexion and sidebending.  This is associated with burning sensation going down the radial aspect of the right upper limb reaching the elbow, forearm, radial aspect of the right wrist into the first digital space along with the thumb and index.  The pain worsens with reaching overhead or with bending with the neck forward and to the side.  Rotation of the neck is limited by the tight muscles at the base of the neck and also by increased pain to the upper limb.  This is associated with weakness with the right elbow flexion along with weakened  and decreased manual dexterity with the right hand.  Occasionally dropping objects if not careful with handling using the right hand.  No reported difficulty with the gait and no trouble with bowel or bladder continence.    The symptoms appeared spontaneously without history intervening trauma or falls.   But 3 months ago the pain got worse after she got ill with the flu with vomiting and coughing    The pain is interfering with activities of daily living, quality of life and quality of sleep. It is limiting the functions and everything takes longer to complete because of the slowing related to the pain. Movements are cautious to avoid aggravation of the symptoms.       Pain level at rest    is 4/10 , with the aggravation ,  pain level 6 to 7/10.     Review of Systems     Denied any fever or chills. No weight loss and no night sweats. No cough or sputum production. No diarrhea   The constipation has been responding to fibers and over  the counter medications.     No bladder and bowel incontinence and no other changes in bladder and bowel. No skin changes.  Reports tiredness and fatigability only if the pain is not controlled.   Denied opioids diversion and abuse and denies alcoholism. Denies overuse of the pain medications.         Physical examination  Awake, alert and oriented for time place and persons   declined Chaperone for the visit and was adequately  draped for the exam.  Examination of the cervical spine showed tight muscle bands on the right side limiting the range of motion of the cervical spine in flexion and bending to the right side because that increases the pain.  Spurling's maneuver increased the pain at the base of the neck and down the right upper limb on the radial aspect of the arm reaching above the elbow, forearm and the first interdigital space including the thumb and index finger. Similar findings with cervical root tension sign on the right side with the pain reaching down to the thumb and index finger.   Decreased sensory to light touch on the radial aspect of the right forearm with the first interdigital webspace as well as the thumb and index finger.  Deep tendon reflexes showed decreased biceps and brachioradialis reflex.  The  triceps reflexes is  present.  Elbow flexion and supination are 4/5 on the right compared to 5/5 on the left.  The right  is slightly weaker compared to the left .  Tinel's sign over the median nerve at the wrist and ulnar nerve at the elbow are both negative.    No increased tendon reflexes in the lower limbs plantar cutaneous are downgoing bilaterally.        Similar findings on the left   plantar cutaneous reflex are down going bilaterally   No long tract signs to the lower limbs   She has mechanical back pain and wanted to handle the neck first.    Diagnosis  Problem List Items Addressed This Visit       Spondylosis of cervical region without myelopathy or radiculopathy - Primary     Brachial radiculitis    Relevant Orders    Epidural Steroid Injection    Myofascial pain        Plan  Reviewed the pain generators.  Went over the types of pain with neuropathic and nociceptive and different pathologies and therapeutic modalities. Discussed the mechanism of action of interventions from acupuncture, physical therapy , regular exercises, injections, botox, spinal cord stimulation, and role of surgery     Went over pathology of the intervertebral disc displacement and the anatomical relation to the Nerve roots and relation to the radicular symptoms. Went over treatment modalities with conservative treatment including acupuncture   and epidural steroid injection with fluoroscopy guidance and last resort of surgery    discussed with her about cervical spondylosis inducing Myofascial pain and radicular pain and willl address those     Consider Tune up ball for TP breakdown and myofascial pain     Start water therapy already referred to  Joel her abotu yong Risks not limited to infection, sepsis, abscess, bleeding , nerve injury, paralysis, and death...        Discussed about NSAIDS and I explained about the opioids sparing effect to allow keeping the opioids dose at minimal effective dose.   I went over the potential side effects of the NSAIDS on the gastrointestinal, renal and cardiovascular systems.      I detailed the side effects from the acetaminophen in the medication and made aware of those. I also explained about the cumulative effects on the organs and mainly the liver.    .     Follow-up after above  or earlier if needed     The level of clinical decision making in this office visit,  is high, given the high risks of complications with the morbidity and mortality due to the fact that acute and chronic pain may pose a threat to life and bodily function, if under treated, poorly treated, or with failure to maintain adequate treatment and timely medical follow up. Additionally over treatment has  its own set of complications including overdosing on the pain medications and also the habit forming potentials with the use of the medications used to treat chronic painful conditions including therapeutic classes classified as dangerous medications. Given the serious and fluctuating nature of pain level and instensity with extensive consideration for whenever pain changes, there is always the risk of prolonged functional impairment requiring close patient monitoring with regular assessments and reassessments and high level medical decision making at every office visit. The amount and complexity of data reviewed is high given the patient clinical presentation, labs,  data, radiology reports, and other tests as discussed during office visits. Pertinent data whether positive or negative were taken in consideration in the process of making this high level medical decision.

## 2024-04-25 ENCOUNTER — APPOINTMENT (OUTPATIENT)
Dept: RADIOLOGY | Facility: HOSPITAL | Age: 54
End: 2024-04-25
Payer: COMMERCIAL

## 2024-05-07 ENCOUNTER — OFFICE VISIT (OUTPATIENT)
Dept: SURGERY | Facility: HOSPITAL | Age: 54
End: 2024-05-07
Payer: COMMERCIAL

## 2024-05-07 ENCOUNTER — OFFICE VISIT (OUTPATIENT)
Dept: GASTROENTEROLOGY | Facility: HOSPITAL | Age: 54
End: 2024-05-07
Payer: COMMERCIAL

## 2024-05-07 VITALS
SYSTOLIC BLOOD PRESSURE: 131 MMHG | WEIGHT: 190 LBS | OXYGEN SATURATION: 97 % | TEMPERATURE: 97.8 F | HEIGHT: 64 IN | DIASTOLIC BLOOD PRESSURE: 86 MMHG | HEART RATE: 109 BPM | BODY MASS INDEX: 32.44 KG/M2

## 2024-05-07 VITALS
BODY MASS INDEX: 32.44 KG/M2 | SYSTOLIC BLOOD PRESSURE: 141 MMHG | DIASTOLIC BLOOD PRESSURE: 103 MMHG | HEART RATE: 113 BPM | WEIGHT: 189 LBS

## 2024-05-07 DIAGNOSIS — R13.19 ESOPHAGEAL DYSPHAGIA: ICD-10-CM

## 2024-05-07 DIAGNOSIS — R14.0 ABDOMINAL BLOATING: ICD-10-CM

## 2024-05-07 DIAGNOSIS — R14.0 ABDOMINAL DISTENSION: ICD-10-CM

## 2024-05-07 DIAGNOSIS — R10.9 ABDOMINAL PAIN, UNSPECIFIED ABDOMINAL LOCATION: ICD-10-CM

## 2024-05-07 DIAGNOSIS — R12 HEARTBURN: ICD-10-CM

## 2024-05-07 DIAGNOSIS — R11.2 NAUSEA AND VOMITING, UNSPECIFIED VOMITING TYPE: ICD-10-CM

## 2024-05-07 DIAGNOSIS — R43.2 DYSGEUSIA: ICD-10-CM

## 2024-05-07 DIAGNOSIS — D12.6 TUBULAR ADENOMA OF COLON: Primary | ICD-10-CM

## 2024-05-07 DIAGNOSIS — R19.8 ALTERNATING CONSTIPATION AND DIARRHEA: ICD-10-CM

## 2024-05-07 DIAGNOSIS — R68.81 EARLY SATIETY: ICD-10-CM

## 2024-05-07 PROCEDURE — 99204 OFFICE O/P NEW MOD 45 MIN: CPT | Performed by: NURSE PRACTITIONER

## 2024-05-07 PROCEDURE — 99214 OFFICE O/P EST MOD 30 MIN: CPT | Performed by: SURGERY

## 2024-05-07 PROCEDURE — 1036F TOBACCO NON-USER: CPT | Performed by: SURGERY

## 2024-05-07 PROCEDURE — 1036F TOBACCO NON-USER: CPT | Performed by: NURSE PRACTITIONER

## 2024-05-07 PROCEDURE — 99214 OFFICE O/P EST MOD 30 MIN: CPT | Performed by: NURSE PRACTITIONER

## 2024-05-07 RX ORDER — POLYETHYLENE GLYCOL 3350, SODIUM SULFATE ANHYDROUS, SODIUM BICARBONATE, SODIUM CHLORIDE, POTASSIUM CHLORIDE 236; 22.74; 6.74; 5.86; 2.97 G/4L; G/4L; G/4L; G/4L; G/4L
4000 POWDER, FOR SOLUTION ORAL ONCE
Qty: 4000 ML | Refills: 0 | Status: SHIPPED | OUTPATIENT
Start: 2024-05-07 | End: 2024-05-07

## 2024-05-07 RX ORDER — IBUPROFEN 400 MG/1
400 TABLET ORAL AS NEEDED
COMMUNITY

## 2024-05-07 RX ORDER — PANTOPRAZOLE SODIUM 20 MG/1
20 TABLET, DELAYED RELEASE ORAL
Qty: 30 TABLET | Refills: 5 | Status: SHIPPED | OUTPATIENT
Start: 2024-05-07

## 2024-05-07 ASSESSMENT — ENCOUNTER SYMPTOMS
DIAPHORESIS: 0
ADENOPATHY: 0
MYALGIAS: 0
NUMBNESS: 0
FATIGUE: 0
NERVOUS/ANXIOUS: 0
EYE PAIN: 0
LIGHT-HEADEDNESS: 0
DEPRESSION: 0
JOINT SWELLING: 0
HEMATURIA: 0
SORE THROAT: 0
FLANK PAIN: 0
FEVER: 0
SHORTNESS OF BREATH: 0
WHEEZING: 0
BACK PAIN: 0
HALLUCINATIONS: 0
PALPITATIONS: 0
PHOTOPHOBIA: 0
AGITATION: 0
COUGH: 0
CHILLS: 0
ARTHRALGIAS: 0
HEADACHES: 0
FREQUENCY: 0
WEAKNESS: 0
DIZZINESS: 0
DYSURIA: 0

## 2024-05-07 ASSESSMENT — PAIN SCALES - GENERAL: PAINLEVEL: 7

## 2024-05-07 NOTE — LETTER
May 7, 2024     Conrad Domínguez MD  [address] [address]    Patient: Kacie Antoine   YOB: 1970   Date of Visit: 5/7/2024       Dear Dr. Conrad Domínguez MD:    Thank you for referring Kacie Antoine to me for evaluation. Below are my notes for this consultation.  If you have questions, please do not hesitate to call me. I look forward to following your patient along with you.       Sincerely,     Clinton Davis MD      CC: Clayton Wayne Seiple, DO  ______________________________________________________________________________________    History Of Present Illness  Kacie Antoine is a 54 y.o. female presenting with long history of chronic abdominal pain especially with stretching reaching and swimming.  History of numerous prior surgical procedures including cholecystectomy, laparoscopic hernia repair and then she had a transversus abdominis release done at [facility] in 2019.  This hernia recurred secondary to midline disruption and some mild lunar line injury.  Eli from  then did a redo TAR via open approach with removal of old mesh, muscle debridement and placement of a 30 x 30 cm polypropylene mesh.  Most recently she had a CT scan done showing integrity of that repair.  She also has upcoming appointment with gastroenterology.     Past Medical History  No past medical history on file.    Surgical History  Past Surgical History:   Procedure Laterality Date   • EXPLORATORY LAPAROTOMY     • OTHER SURGICAL HISTORY  02/23/2021    Gallbladder surgery   • VENTRAL HERNIA REPAIR          Social History  She reports that she has never smoked. She has never used smokeless tobacco. She reports that she does not drink alcohol and does not use drugs.    Family History  No family history on file.     Allergies  Hydromorphone, Sulfamethoxazole, Codeine, Erythromycin, Iodinated contrast media, Iodine, Penicillins, and Shellfish derived    Review of  Systems  Constitutional: no weight loss, no fevers, no malaise  HEENT: negative  Neck: Chronic neck pain  Pulmonary: no SOB, no cough  CV: no chest pain, otherwise negative  GI: See HPI  : no hematuria, retention.  MS: no aches/pains  Neurologic: negative  Skin: no rashes, lesions  HEME: no bleeding tendency, no bruising  Psych: no mood issues    Physical Exam  General: well appearing, no acute distress, well nourished  HEENT: normal  Neck: supple  Pulmonary: lungs clear to auscultation bilaterally  CV: RR, S1S2, no murmurs.  Pulses palpable and equal.  Good capillary refill  Abdomen: soft, no peritoneal signs.  No clinical evidence of recurrent hernia  : grossly normal external genitalia  MS: grossly normal  Neurologic: alert and oriented, strength/sensation intact  Skin: non jaundiced, no lesions  Psych: mood appropriate    Last Recorded Vitals  There were no vitals taken for this visit.    Relevant Results  {If you would like to pull in Medications, type .meds     If you would like to pull in Lab results for the last 24 hours, type .[id number]    If you would like to pull in Imaging results, type .imgrslt :99}       Show images for CT abdomen pelvis w IV contrast  Signed by    Signed Time Phone Pager   Matt Troy DO 3/29/2024 05:24 [phone]     Exam Information    Status Exam Begun Exam Ended   Final 3/29/2024 03:56 3/29/2024 03:59     Study Result    Narrative & Impression   Interpreted By:  Matt Troy and Stephens Katherine   STUDY:  CT ABDOMEN PELVIS W IV CONTRAST;  3/29/2024 3:59 am      INDICATION:  Signs/Symptoms:has ventral opening in mesh after repair of ventral  hernia, worsening abd pain, N&V.      COMPARISON:  CT abdomen pelvis 02/15/2021      ACCESSION NUMBER(S):  [id number]      ORDERING CLINICIAN:  RASHID BECK      TECHNIQUE:  CT of the abdomen and pelvis was performed.  Standard contiguous  axial images were obtained at 3 mm slice thickness through the  abdomen and  pelvis. Coronal and sagittal reconstructions at 3 mm  slice thickness were performed.      80 ml of contrast Omnipaque 350 were administered intravenously  without immediate complication.      FINDINGS:  LOWER CHEST:  The visualized lung base is unremarkable. The heart is normal in size  without pericardial effusion. No pleural effusion is present.  Visualized distal esophagus appears normal.      ABDOMEN:      LIVER:  The liver is enlarged and demonstrates diffusely decreased  attenuation consistent with hepatic steatosis. The liver is normal in  contour.      BILE DUCTS:  Intrahepatic and extrahepatic bile ducts are prominent which is  within normal limits given the status post cholecystectomy.      GALLBLADDER:  The gallbladder is surgically absent.      PANCREAS:  The pancreas appears unremarkable without evidence of ductal  dilatation or masses.      SPLEEN:  The spleen is enlarg within upper normal limits measuring 12.5 cm  craniocaudally similar to prior.      ADRENAL GLANDS:  Bilateral adrenal glands appear normal.      KIDNEYS AND URETERS:  The kidneys are normal in size and enhance symmetrically.  No  hydroureteronephrosis or nephroureterolithiasis is identified.      PELVIS:      BLADDER:  The urinary bladder appears normal without abnormal wall thickening.      REPRODUCTIVE ORGANS:  The uterus is present.      BOWEL:  The stomach is unremarkable.   The small and large bowel are normal  in caliber and demonstrate no wall thickening.   The appendix is  elongated along and extends anterior to the right hepatic lobe,  however it is normal. There is moderate amount of formed stool  throughout the colon without wall thickening or acute inflammatory  change. VESSELS:  There is no aneurysmal dilatation of the abdominal aorta. The IVC  appears normal.      PERITONEUM/RETROPERITONEUM/LYMPH NODES:  No ascites or free air, no fluid collection.  No abdominopelvic  lymphadenopathy is present.      BONES AND ABDOMINAL  WALL:  No suspicious osseous lesions are identified. Degenerative discogenic  disease is noted in the lower thoracic and lumbar spine.  Postsurgical changes are noted of the ventral abdominal wall from  interval ventral abdominal wall hernia repair. There is ventral  abdominal wall scarring without evidence of recurrent hernia.      IMPRESSION:  1.  No etiology of abdominal pain is evident.  2. Hepatomegaly with diffuse hepatic steatosis.  3. Postsurgical changes from ventral abdominal wall hernia repair. No  evidence of recurrent  hernia.      I personally reviewed the images/study and I agree with Emily Mcqueen DO's (radiology resident) findings as stated. This study  was interpreted at [place], [place].      MACRO:  None      Signed by: Matt Troy 3/29/2024 5:24 AM  Dictation workstation:   [id number]        Assessment/Plan   {Assess/PlanSmartLinks:94916}    Patient with some chronic abdominal pain most likely scar tissue, musculoskeletal issues.  Her CT scan shows no clinical evidence of recurrence or fluid collection.  She is very concerned about resuming an exercise routine due to all her prior surgeries and I tried to reassure her today that the reasonable cardiovascular regimen could be commenced.  Additional surgery at this point would be a catastrophic care.  She finally has abdominal wall continuity and integrity given that she is 2 years out from surgery it is unlikely that light cardiovascular exercise routine would cause disruption.  She is free to contact me to discuss any changes in her clinical condition in the future.       I spent 45 minutes in the professional and overall care of this patient.      Clinton Davis MD

## 2024-05-07 NOTE — PROGRESS NOTES
History Of Present Illness  Kacie Antoine is a 54 y.o. female presenting with long history of chronic abdominal pain especially with stretching reaching and swimming.  History of numerous prior surgical procedures including cholecystectomy, laparoscopic hernia repair and then she had a transversus abdominis release done at [facility] in 2019.  This hernia recurred secondary to midline disruption and some mild lunar line injury.  Eli from  then did a redo TAR via open approach with removal of old mesh, muscle debridement and placement of a 30 x 30 cm polypropylene mesh.  Most recently she had a CT scan done showing integrity of that repair.  She also has upcoming appointment with gastroenterology.     Past Medical History  No past medical history on file.    Surgical History  Past Surgical History:   Procedure Laterality Date    EXPLORATORY LAPAROTOMY      OTHER SURGICAL HISTORY  02/23/2021    Gallbladder surgery    VENTRAL HERNIA REPAIR          Social History  She reports that she has never smoked. She has never used smokeless tobacco. She reports that she does not drink alcohol and does not use drugs.    Family History  No family history on file.     Allergies  Hydromorphone, Sulfamethoxazole, Codeine, Erythromycin, Iodinated contrast media, Iodine, Penicillins, and Shellfish derived    Review of Systems  Constitutional: no weight loss, no fevers, no malaise  HEENT: negative  Neck: Chronic neck pain  Pulmonary: no SOB, no cough  CV: no chest pain, otherwise negative  GI: See HPI  : no hematuria, retention.  MS: no aches/pains  Neurologic: negative  Skin: no rashes, lesions  HEME: no bleeding tendency, no bruising  Psych: no mood issues    Physical Exam  General: well appearing, no acute distress, well nourished  HEENT: normal  Neck: supple  Pulmonary: lungs clear to auscultation bilaterally  CV: RR, S1S2, no murmurs.  Pulses palpable and equal.  Good capillary refill  Abdomen: soft, no peritoneal  signs.  No clinical evidence of recurrent hernia  : grossly normal external genitalia  MS: grossly normal  Neurologic: alert and oriented, strength/sensation intact  Skin: non jaundiced, no lesions  Psych: mood appropriate    Last Recorded Vitals  There were no vitals taken for this visit.    Relevant Results         Show images for CT abdomen pelvis w IV contrast  Signed by    Signed Time Phone Pager   Matt Troy DO 3/29/2024 05:24 [phone]     Exam Information    Status Exam Begun Exam Ended   Final 3/29/2024 03:56 3/29/2024 03:59     Study Result    Narrative & Impression   Interpreted By:  Matt Troy and Stephens Katherine   STUDY:  CT ABDOMEN PELVIS W IV CONTRAST;  3/29/2024 3:59 am      INDICATION:  Signs/Symptoms:has ventral opening in mesh after repair of ventral  hernia, worsening abd pain, N&V.      COMPARISON:  CT abdomen pelvis 02/15/2021      ACCESSION NUMBER(S):  [id number]      ORDERING CLINICIAN:  RASHID BECK      TECHNIQUE:  CT of the abdomen and pelvis was performed.  Standard contiguous  axial images were obtained at 3 mm slice thickness through the  abdomen and pelvis. Coronal and sagittal reconstructions at 3 mm  slice thickness were performed.      80 ml of contrast Omnipaque 350 were administered intravenously  without immediate complication.      FINDINGS:  LOWER CHEST:  The visualized lung base is unremarkable. The heart is normal in size  without pericardial effusion. No pleural effusion is present.  Visualized distal esophagus appears normal.      ABDOMEN:      LIVER:  The liver is enlarged and demonstrates diffusely decreased  attenuation consistent with hepatic steatosis. The liver is normal in  contour.      BILE DUCTS:  Intrahepatic and extrahepatic bile ducts are prominent which is  within normal limits given the status post cholecystectomy.      GALLBLADDER:  The gallbladder is surgically absent.      PANCREAS:  The pancreas appears unremarkable without  evidence of ductal  dilatation or masses.      SPLEEN:  The spleen is enlarg within upper normal limits measuring 12.5 cm  craniocaudally similar to prior.      ADRENAL GLANDS:  Bilateral adrenal glands appear normal.      KIDNEYS AND URETERS:  The kidneys are normal in size and enhance symmetrically.  No  hydroureteronephrosis or nephroureterolithiasis is identified.      PELVIS:      BLADDER:  The urinary bladder appears normal without abnormal wall thickening.      REPRODUCTIVE ORGANS:  The uterus is present.      BOWEL:  The stomach is unremarkable.   The small and large bowel are normal  in caliber and demonstrate no wall thickening.   The appendix is  elongated along and extends anterior to the right hepatic lobe,  however it is normal. There is moderate amount of formed stool  throughout the colon without wall thickening or acute inflammatory  change. VESSELS:  There is no aneurysmal dilatation of the abdominal aorta. The IVC  appears normal.      PERITONEUM/RETROPERITONEUM/LYMPH NODES:  No ascites or free air, no fluid collection.  No abdominopelvic  lymphadenopathy is present.      BONES AND ABDOMINAL WALL:  No suspicious osseous lesions are identified. Degenerative discogenic  disease is noted in the lower thoracic and lumbar spine.  Postsurgical changes are noted of the ventral abdominal wall from  interval ventral abdominal wall hernia repair. There is ventral  abdominal wall scarring without evidence of recurrent hernia.      IMPRESSION:  1.  No etiology of abdominal pain is evident.  2. Hepatomegaly with diffuse hepatic steatosis.  3. Postsurgical changes from ventral abdominal wall hernia repair. No  evidence of recurrent  hernia.      I personally reviewed the images/study and I agree with Emily Mcqueen DO's (radiology resident) findings as stated. This study  was interpreted at [facility],  [place], [place].      MACRO:  None      Signed by: Matt Troy  3/29/2024 5:24 AM  Dictation workstation:   [id number]        Assessment/Plan       Patient with some chronic abdominal pain most likely scar tissue, musculoskeletal issues.  Her CT scan shows no clinical evidence of recurrence or fluid collection.  She is very concerned about resuming an exercise routine due to all her prior surgeries and I tried to reassure her today that the reasonable cardiovascular regimen could be commenced.  Additional surgery at this point would be a catastrophic care.  She finally has abdominal wall continuity and integrity given that she is 2 years out from surgery it is unlikely that light cardiovascular exercise routine would cause disruption.  She is free to contact me to discuss any changes in her clinical condition in the future.       I spent 45 minutes in the professional and overall care of this patient.      Clinton Davis MD

## 2024-05-07 NOTE — PATIENT INSTRUCTIONS
Thanks for coming to the GI clinic.     I would like you to get EGD.     You will be scheduled for a colonoscopy.   Please read all of the instructions 7 days before your colonoscopy.   You will need to take ONLY clear liquids the ENTIRE day before your procedure. These include (clear fruit juices, soda, Gatorade, broth, jello and coffee/tea) Avoid red and purple drinks. No cream or milk in the coffee.   You will need to take a bowel preparation.   You will also need a .      Please call [phone] to schedule the above procedures.     Start pantoprazole 20 mg once daily (30-60 minutes prior to breakfast).     Follow up 3 weeks after completion of the above.

## 2024-05-07 NOTE — PROGRESS NOTES
"Subjective   Patient ID: Kacie Antoine is a 54 y.o. female who presents for nausea and abdominal pain.    This is a 54 year old WF with history of chronic neck/back pain, multiple ventral hernia repairs,  and abdominal wall reconstruction who is presenting to the GI clinic for an initial visit.     History per pt and review of EMR including OSH records     Of note, on  3/28/24 she was seen in the [facility] ED with abdominal and back pain. CT A/P at that time was without any acute findings.     Report diffuse migratory abdominal pain which began in the midst of her hernia repairs.  She has dull diffuse abdominal pain which lasts all day. She also has sharp pain under her right rib cage lasting a short time. The abdominal pain somewhat improves with defecation and passage of flatus.     Reports since her abdominal surgeries she's been having constipation alternating with diarrhea (50 % of each). Describes stools are small hard pellets to watery in nature. She moves her bowels every day. She can have diarrhea up to 6 times per day.  She wakes up at night frequently with diarrhea and constipation.     She is not currently on laxatives or antidiarrheals. She took Colace and Miralax briefly after her hernia repairs.     ROS positive for abdominal bloating and intermittent abdominal distension.     Reports 3 months ago she had the \"stomach flu\" for 10 days and since that time she's been having issues with nausea/vomiting. She could not say with confidence if she was checked for influenza at that time.     She endorses a bad taste in her mouth, \"like poison\".      ROS positive for esophageal dysphagia    ROS positive for early satiety.     ROS positive for heartburn refractory to omeprazole and esomeprazole     Denies unintentional weight loss, hematemesis, hematochezia, and melena.     Diet includes gluten.     Avoids spicy foods.     She saw Dr. Davis with  general surgery today regarding her hernia history. Dr." "Davis felt she has abdominal wall continuity and integrity. Surgery was not recommended.     Previously saw  [facility]. She underwent EGD/colonoscopy on 9/21/17 with Dr. Yvan Vázquez in [place]. EGD noted chronic mild gastritis, but was otherwise normal. Colonoscopy noted two 6 to 9 mm polyps in the rectum which were removed  with cold snare and internal hemorrhoids. Random biopsy obtained. Repeat colonoscopy was recommended  5 years from that time. See pathology below.     A. STOMACH, BODY, BIOPSY - REACTIVE GASTROPATHY.     COMMENT:  Evaluation of the H&E-stained sections shows no evidence   of Helicobacter pylori or any morphologic features to suggest   infection with the organism; as such, further studies for   Helicobacter are not indicated.  There is no evidence of   intestinal metaplasia, dysplasia or malignancy.     B. COLON, RANDOM, BIOPSY - BENIGN COLONIC MUCOSA WITH LYMPHOID AGGREGATES AND NO SPECIFIC PATHOLOGIC FEATURES.     C. COLON, RECTUM, POLYP - FRAGMENTS OF TUBULAR ADENOMA AND HYPERPLASTIC  POLYP.       Past medical history:   - See above   -  \"Chronic asthmatic tendencies\"     Past surgical history:   - Cholecystectomy (2015)   - Open ventral hernia repair with mesh and bilateral component separation/transverse abdominal muscle release ([facility] Dr. Lind 2/2019)  - Open ventral hernia repair with retrorectus mesh and DEVYN ([facility] Dr. Lind 5/2020)  - Open abdominal wall reconstruction with extensive DEVYN, removal of mesh/sutures and mesh implantation for recurrent hernia ( Dr. Velásquez 3/8/21)      Family history:  - Son- IBS   - No GI cancers  - Mother- bladder cancer     Social history:   - Denies use of alcohol, tobacco, and illicit drugs   - Has a 17 year old son         Review of Systems   Constitutional:  Negative for chills, diaphoresis, fatigue and fever.   HENT:  Negative for congestion, ear pain, hearing loss, sneezing and sore throat.    Eyes:  Negative for photophobia, pain and " "visual disturbance.   Respiratory:  Negative for cough, shortness of breath and wheezing.    Cardiovascular:  Negative for chest pain, palpitations and leg swelling.   Endocrine: Negative for cold intolerance and heat intolerance.   Genitourinary:  Negative for dysuria, flank pain, frequency and hematuria.   Musculoskeletal:  Negative for arthralgias, back pain, gait problem, joint swelling and myalgias.   Skin:  Negative for rash.   Neurological:  Negative for dizziness, syncope, weakness, light-headedness, numbness and headaches.   Hematological:  Negative for adenopathy.   Psychiatric/Behavioral:  Negative for agitation and hallucinations. The patient is not nervous/anxious.        Allergies   Allergen Reactions    Hydromorphone Itching and Swelling    Sulfamethoxazole Unknown    Codeine Rash    Erythromycin Rash    Iodinated Contrast Media Rash    Iodine Rash    Penicillins Rash    Shellfish Derived Rash      Current Outpatient Medications   Medication Sig Dispense Refill    ibuprofen 400 mg tablet Take 1 tablet (400 mg) by mouth if needed for moderate pain (4 - 6).      methocarbamol (Robaxin) 500 mg tablet every 6 hours.      pantoprazole (ProtoNix) 20 mg EC tablet Take 1 tablet (20 mg) by mouth once daily in the morning. Take before meals. To be taken 30-60 minutes prior to breakfast. Do not crush, chew, or split. 30 tablet 5    polyethylene glycol-electrolytes (polyethylene glycol) 420 gram solution Take 4,000 mL by mouth 1 time for 1 dose. Use as directed by  endoscopy department for colonoscopy 4000 mL 0     No current facility-administered medications for this visit.        Objective     /86   Pulse 109   Temp 36.6 °C (97.8 °F)   Ht 1.626 m (5' 4\")   Wt 86.2 kg (190 lb)   SpO2 97%   BMI 32.61 kg/m²     Physical Exam  Constitutional:       General: She is not in acute distress.     Appearance: Normal appearance.   HENT:      Head: Normocephalic and atraumatic.   Eyes:      Conjunctiva/sclera: " Conjunctivae normal.   Cardiovascular:      Rate and Rhythm: Normal rate and regular rhythm.      Heart sounds: No murmur heard.     No gallop.   Pulmonary:      Effort: Pulmonary effort is normal.      Breath sounds: Normal breath sounds.   Abdominal:      General: Bowel sounds are normal. There is no distension.      Tenderness: There is no abdominal tenderness. There is no guarding.      Comments: Well healed abdominal incisions   Musculoskeletal:         General: No swelling or deformity. Normal range of motion.      Cervical back: Normal range of motion. No rigidity.   Skin:     General: Skin is warm and dry.      Coloration: Skin is not jaundiced.      Findings: No lesion or rash.   Neurological:      General: No focal deficit present.      Mental Status: She is alert and oriented to person, place, and time.   Psychiatric:         Mood and Affect: Mood normal.         Assessment/Plan   Problem List Items Addressed This Visit    None  Visit Diagnoses       Tubular adenoma of colon    -  Primary    Relevant Medications    polyethylene glycol-electrolytes (polyethylene glycol) 420 gram solution    Other Relevant Orders    Colonoscopy Diagnostic    Esophageal dysphagia        Relevant Orders    Esophagogastroduodenoscopy (EGD)    Early satiety        Relevant Orders    Esophagogastroduodenoscopy (EGD)    Nausea and vomiting, unspecified vomiting type        Relevant Orders    Esophagogastroduodenoscopy (EGD)    Heartburn        Relevant Medications    pantoprazole (ProtoNix) 20 mg EC tablet    Other Relevant Orders    Esophagogastroduodenoscopy (EGD)    Abdominal bloating        Relevant Orders    Esophagogastroduodenoscopy (EGD)    Abdominal distension        Dysgeusia        Alternating constipation and diarrhea        Relevant Orders    Esophagogastroduodenoscopy (EGD)           PPI refractory heartburn, dysgeusia, nausea/vomiting, esophageal dysphagia, early satiety: wide differential. Presentation at least  somewhat suggestive of acid reflux, but she has not responded to 2 different PPIs. Also consider functional etiology, non acid reflux, EoE, peptic stricture, PUD, and upper GI malignancy with the latter far less likely. Pt is interested in trying a different PPI.   - will proceed with EGD with esophageal biopsies  - start pantoprazole 20 mg once daily (30-60 minutes prior to breakfast)     2. Chronic migratory abdominal pain, constipation alternating with diarrhea, abdominal bloating, intermittent abdominal distension: IBS high on the differential. Also consider celiac disease and IBD/colitis with the latter less likely given prior colonoscopy results. I do not believe she needs to be rechecked for microscopic colitis.   - EGD as above with duodenal biopsy     3. History of tubular adenoma:  - will proceed with surveillance colonoscopy   - Golytely split bowel prep     4. Follow up:  - return to clinic 3 weeks after the completion of EGD and colonoscopy

## 2024-05-08 ENCOUNTER — HOSPITAL ENCOUNTER (OUTPATIENT)
Dept: RADIOLOGY | Facility: CLINIC | Age: 54
Setting detail: OUTPATIENT SURGERY
Discharge: HOME | End: 2024-05-08
Payer: COMMERCIAL

## 2024-05-08 ENCOUNTER — HOSPITAL ENCOUNTER (OUTPATIENT)
Dept: OPERATING ROOM | Facility: CLINIC | Age: 54
Setting detail: OUTPATIENT SURGERY
Discharge: HOME | End: 2024-05-08
Payer: COMMERCIAL

## 2024-05-08 VITALS
HEART RATE: 85 BPM | BODY MASS INDEX: 32.52 KG/M2 | OXYGEN SATURATION: 96 % | SYSTOLIC BLOOD PRESSURE: 123 MMHG | DIASTOLIC BLOOD PRESSURE: 78 MMHG | WEIGHT: 190.48 LBS | HEIGHT: 64 IN | RESPIRATION RATE: 16 BRPM | TEMPERATURE: 97.7 F

## 2024-05-08 DIAGNOSIS — M54.12 BRACHIAL RADICULITIS: ICD-10-CM

## 2024-05-08 PROCEDURE — 99152 MOD SED SAME PHYS/QHP 5/>YRS: CPT | Performed by: PHYSICAL MEDICINE & REHABILITATION

## 2024-05-08 PROCEDURE — 2500000005 HC RX 250 GENERAL PHARMACY W/O HCPCS: Performed by: PHYSICAL MEDICINE & REHABILITATION

## 2024-05-08 PROCEDURE — 2500000004 HC RX 250 GENERAL PHARMACY W/ HCPCS (ALT 636 FOR OP/ED): Performed by: PHYSICAL MEDICINE & REHABILITATION

## 2024-05-08 PROCEDURE — A4216 STERILE WATER/SALINE, 10 ML: HCPCS | Performed by: PHYSICAL MEDICINE & REHABILITATION

## 2024-05-08 PROCEDURE — 62321 NJX INTERLAMINAR CRV/THRC: CPT

## 2024-05-08 PROCEDURE — 62321 NJX INTERLAMINAR CRV/THRC: CPT | Performed by: PHYSICAL MEDICINE & REHABILITATION

## 2024-05-08 RX ORDER — TRIAMCINOLONE ACETONIDE 40 MG/ML
INJECTION, SUSPENSION INTRA-ARTICULAR; INTRAMUSCULAR AS NEEDED
Status: COMPLETED | OUTPATIENT
Start: 2024-05-08 | End: 2024-05-08

## 2024-05-08 RX ORDER — FENTANYL CITRATE 50 UG/ML
INJECTION, SOLUTION INTRAMUSCULAR; INTRAVENOUS AS NEEDED
Status: COMPLETED | OUTPATIENT
Start: 2024-05-08 | End: 2024-05-08

## 2024-05-08 RX ORDER — LIDOCAINE HYDROCHLORIDE 5 MG/ML
INJECTION, SOLUTION INFILTRATION; PERINEURAL AS NEEDED
Status: COMPLETED | OUTPATIENT
Start: 2024-05-08 | End: 2024-05-08

## 2024-05-08 RX ORDER — SODIUM CHLORIDE 9 MG/ML
INJECTION, SOLUTION INTRAMUSCULAR; INTRAVENOUS; SUBCUTANEOUS AS NEEDED
Status: COMPLETED | OUTPATIENT
Start: 2024-05-08 | End: 2024-05-08

## 2024-05-08 RX ADMIN — LIDOCAINE HYDROCHLORIDE 5 ML: 5 INJECTION, SOLUTION INFILTRATION; PERINEURAL at 10:09

## 2024-05-08 RX ADMIN — FENTANYL CITRATE 75 MCG: 50 INJECTION, SOLUTION INTRAMUSCULAR; INTRAVENOUS at 10:07

## 2024-05-08 RX ADMIN — SODIUM CHLORIDE 1 ML: 9 INJECTION INTRAMUSCULAR; INTRAVENOUS; SUBCUTANEOUS at 10:10

## 2024-05-08 RX ADMIN — TRIAMCINOLONE ACETONIDE 40 MG: 40 INJECTION, SUSPENSION INTRA-ARTICULAR; INTRAMUSCULAR at 10:10

## 2024-05-08 ASSESSMENT — PAIN - FUNCTIONAL ASSESSMENT
PAIN_FUNCTIONAL_ASSESSMENT: 0-10

## 2024-05-08 ASSESSMENT — PAIN SCALES - GENERAL
PAINLEVEL_OUTOF10: 8
PAINLEVEL_OUTOF10: 0 - NO PAIN
PAINLEVEL_OUTOF10: 0 - NO PAIN

## 2024-05-08 ASSESSMENT — COLUMBIA-SUICIDE SEVERITY RATING SCALE - C-SSRS
6. HAVE YOU EVER DONE ANYTHING, STARTED TO DO ANYTHING, OR PREPARED TO DO ANYTHING TO END YOUR LIFE?: NO
1. IN THE PAST MONTH, HAVE YOU WISHED YOU WERE DEAD OR WISHED YOU COULD GO TO SLEEP AND NOT WAKE UP?: NO
2. HAVE YOU ACTUALLY HAD ANY THOUGHTS OF KILLING YOURSELF?: NO

## 2024-05-08 NOTE — H&P
Cervical radic    Review of Systems  Denied any fever or chills. No weight loss and no night sweats. No cough or sputum production. No diarrhea   The constipation has been responding to fibers and over the counter medications.     No bladder and bowel incontinence and no other changes in bladder and bowel. No skin changes.  Reports tiredness and fatigability only if the pain is not controlled.   Denied opioids diversion and abuse and denies alcoholism. Denies overuse of the pain medications.    Proceed with cervical HIRAL at planned  Consented  Asa 2

## 2024-05-08 NOTE — OP NOTE
Operative Note     Date: 2024  [place] [facility] [place] [id number]OSC[facility] OR    Name: Kacie Antoine, : 1970, Age: 54 y.o., MRN: [id number], Sex: female    Diagnosis  Cervical radic  Cervical radic      Procedures  Cervical epidural steroid injection with fluoroscopy guidance     Surgeons   Michael Srinivasan MD        Procedure Summary  Anesthesia: local with sedation ASA: 2     Estimated Blood Loss: 0mL  Intra-op Medications: * Intraprocedure medication information is  available. Patient sedated but responsive to verbal commands. Monitoring of the vitals signs performed by qualified Registered Nurse during the entire procedure  Please see sedation record for sedation by RN.       Intraprocedure I/O Totals       None           Specimen: No specimens collected     Staff:   Circulator: Bianca Cortés RN         Drains and/or Catheters: * None in log *         Findings: End plates changes     Indications: Kacie Antoine is an 54 y.o. female who is having Cervical epidural steroid injection with fluoroscopy guidance     The patient was seen in the preoperative area. The risks, benefits, complications, treatment options, non-operative alternatives, expected recovery and outcomes were discussed with the patient. The possibilities of reaction to medication, pulmonary aspiration, injury to surrounding structures, bleeding, recurrent infection, the need for additional procedures, failure to diagnose a condition, and creating a complication requiring transfusion or operation were discussed with the patient. The patient concurred with the proposed plan, giving informed consent.  The site of surgery was properly noted/marked if necessary per policy. The patient has been actively warmed in preoperative area. Preoperative antibiotics are not indicated. Venous thrombosis prophylaxis are not indicated.    Procedure Details: Time-in:   1005 am Time-out: 1015 am     Level:  C7T1 midline    Sedation:  75 mcg of IV  fentanyl  . Patient   sedated but responsive to verbal commands. Monitoring of the vitals signs performed by qualified Registered Nurse during the entire procedure    Please see sedation record for sedation by RN    Indications: Failure to respond to conservative treatment with therapy and medications.      PROCEDURE:    The risks, benefits and alternatives of the procedure were discussed with the patient and agreed to proceed. The risks included but not limited to: infection, bleeding, paralysis, nerve injury sepsis and remotely death were discussed with the patient during the office and again in the pre procedure area.  The patient signed informed consent in the pre procedure area.     The patient was brought to procedure room and time out for the procedure was performed with the procedure room staff present. Patient placed in prone position on the procedure table and draped and covered appropriately.      Fluoroscopy machine was used to identify the C7T1 interspace.     Skin prepped and draped in sterile fashion over the cervical spine area.  Skin was infiltrated with local anesthetic with 0.5% lidocaine.  Tuohy needle was slowly introduced to the epidural space, verified with hanging drop and loss of resistance techniques.   At that point, 40 mg of kenalog mixed with 5 mL of normal saline were injected.      felt the tingling down the upper  limb during the injection     Procedure tolerated very well.  No complications encountered.  Post procedure care discussed with the patient, agreed to proceed.   Patient instructed on keeping track of the pain level post discharge.   Patient discharged home, from the recovery room, in stable condition.   Complications:  None; patient tolerated the procedure well.    Disposition: PACU - hemodynamically stable.  Condition: stable              Attending Attestation: I performed the procedure.    Michael Srinivasan MD

## 2025-01-07 ENCOUNTER — HOSPITAL ENCOUNTER (EMERGENCY)
Facility: HOSPITAL | Age: 55
Discharge: HOME | End: 2025-01-07
Attending: STUDENT IN AN ORGANIZED HEALTH CARE EDUCATION/TRAINING PROGRAM
Payer: COMMERCIAL

## 2025-01-07 ENCOUNTER — APPOINTMENT (OUTPATIENT)
Dept: RADIOLOGY | Facility: HOSPITAL | Age: 55
End: 2025-01-07
Payer: COMMERCIAL

## 2025-01-07 ENCOUNTER — APPOINTMENT (OUTPATIENT)
Dept: CARDIOLOGY | Facility: HOSPITAL | Age: 55
End: 2025-01-07
Payer: COMMERCIAL

## 2025-01-07 VITALS
DIASTOLIC BLOOD PRESSURE: 80 MMHG | SYSTOLIC BLOOD PRESSURE: 147 MMHG | RESPIRATION RATE: 18 BRPM | WEIGHT: 180 LBS | OXYGEN SATURATION: 97 % | HEART RATE: 86 BPM | HEIGHT: 64 IN | TEMPERATURE: 98.1 F | BODY MASS INDEX: 30.73 KG/M2

## 2025-01-07 DIAGNOSIS — K92.2 GASTROINTESTINAL HEMORRHAGE, UNSPECIFIED GASTROINTESTINAL HEMORRHAGE TYPE: ICD-10-CM

## 2025-01-07 DIAGNOSIS — R10.84 ABDOMINAL PAIN, GENERALIZED: Primary | ICD-10-CM

## 2025-01-07 LAB
ALBUMIN SERPL BCP-MCNC: 4.8 G/DL (ref 3.4–5)
ALP SERPL-CCNC: 79 U/L (ref 33–110)
ALT SERPL W P-5'-P-CCNC: 41 U/L (ref 7–45)
ANION GAP SERPL CALC-SCNC: 13 MMOL/L (ref 10–20)
AST SERPL W P-5'-P-CCNC: 27 U/L (ref 9–39)
ATRIAL RATE: 87 BPM
BASOPHILS # BLD AUTO: 0.03 X10*3/UL (ref 0–0.1)
BASOPHILS NFR BLD AUTO: 0.4 %
BILIRUB SERPL-MCNC: 0.4 MG/DL (ref 0–1.2)
BUN SERPL-MCNC: 16 MG/DL (ref 6–23)
CALCIUM SERPL-MCNC: 9.5 MG/DL (ref 8.6–10.3)
CARDIAC TROPONIN I PNL SERPL HS: <3 NG/L (ref 0–13)
CHLORIDE SERPL-SCNC: 103 MMOL/L (ref 98–107)
CO2 SERPL-SCNC: 26 MMOL/L (ref 21–32)
CREAT SERPL-MCNC: 0.62 MG/DL (ref 0.5–1.05)
EGFRCR SERPLBLD CKD-EPI 2021: >90 ML/MIN/1.73M*2
EOSINOPHIL # BLD AUTO: 0.62 X10*3/UL (ref 0–0.7)
EOSINOPHIL NFR BLD AUTO: 7.4 %
ERYTHROCYTE [DISTWIDTH] IN BLOOD BY AUTOMATED COUNT: 13.5 % (ref 11.5–14.5)
FLUAV RNA RESP QL NAA+PROBE: NOT DETECTED
FLUBV RNA RESP QL NAA+PROBE: NOT DETECTED
GLUCOSE SERPL-MCNC: 163 MG/DL (ref 74–99)
HCT VFR BLD AUTO: 41.1 % (ref 36–46)
HGB BLD-MCNC: 13.6 G/DL (ref 12–16)
IMM GRANULOCYTES # BLD AUTO: 0.02 X10*3/UL (ref 0–0.7)
IMM GRANULOCYTES NFR BLD AUTO: 0.2 % (ref 0–0.9)
LACTATE SERPL-SCNC: 1.5 MMOL/L (ref 0.4–2)
LIPASE SERPL-CCNC: 50 U/L (ref 9–82)
LYMPHOCYTES # BLD AUTO: 2.59 X10*3/UL (ref 1.2–4.8)
LYMPHOCYTES NFR BLD AUTO: 30.8 %
MAGNESIUM SERPL-MCNC: 1.94 MG/DL (ref 1.6–2.4)
MCH RBC QN AUTO: 28.4 PG (ref 26–34)
MCHC RBC AUTO-ENTMCNC: 33.1 G/DL (ref 32–36)
MCV RBC AUTO: 86 FL (ref 80–100)
MONOCYTES # BLD AUTO: 0.53 X10*3/UL (ref 0.1–1)
MONOCYTES NFR BLD AUTO: 6.3 %
NEUTROPHILS # BLD AUTO: 4.63 X10*3/UL (ref 1.2–7.7)
NEUTROPHILS NFR BLD AUTO: 54.9 %
NRBC BLD-RTO: 0 /100 WBCS (ref 0–0)
P AXIS: 54 DEGREES
PLATELET # BLD AUTO: 296 X10*3/UL (ref 150–450)
POTASSIUM SERPL-SCNC: 3.9 MMOL/L (ref 3.5–5.3)
PR INTERVAL: 147 MS
PROT SERPL-MCNC: 7.6 G/DL (ref 6.4–8.2)
Q ONSET: 253 MS
QRS COUNT: 13 BEATS
QRS DURATION: 92 MS
QT INTERVAL: 364 MS
QTC CALCULATION(BAZETT): 433 MS
QTC FREDERICIA: 408 MS
R AXIS: 16 DEGREES
RBC # BLD AUTO: 4.79 X10*6/UL (ref 4–5.2)
SARS-COV-2 RNA RESP QL NAA+PROBE: NOT DETECTED
SODIUM SERPL-SCNC: 138 MMOL/L (ref 136–145)
T AXIS: 33 DEGREES
T OFFSET: 435 MS
VENTRICULAR RATE: 85 BPM
WBC # BLD AUTO: 8.4 X10*3/UL (ref 4.4–11.3)

## 2025-01-07 PROCEDURE — 83605 ASSAY OF LACTIC ACID: CPT | Performed by: STUDENT IN AN ORGANIZED HEALTH CARE EDUCATION/TRAINING PROGRAM

## 2025-01-07 PROCEDURE — 96374 THER/PROPH/DIAG INJ IV PUSH: CPT

## 2025-01-07 PROCEDURE — 2550000001 HC RX 255 CONTRASTS: Performed by: STUDENT IN AN ORGANIZED HEALTH CARE EDUCATION/TRAINING PROGRAM

## 2025-01-07 PROCEDURE — 83735 ASSAY OF MAGNESIUM: CPT | Performed by: STUDENT IN AN ORGANIZED HEALTH CARE EDUCATION/TRAINING PROGRAM

## 2025-01-07 PROCEDURE — 96375 TX/PRO/DX INJ NEW DRUG ADDON: CPT

## 2025-01-07 PROCEDURE — 87636 SARSCOV2 & INF A&B AMP PRB: CPT | Performed by: STUDENT IN AN ORGANIZED HEALTH CARE EDUCATION/TRAINING PROGRAM

## 2025-01-07 PROCEDURE — 80053 COMPREHEN METABOLIC PANEL: CPT | Performed by: STUDENT IN AN ORGANIZED HEALTH CARE EDUCATION/TRAINING PROGRAM

## 2025-01-07 PROCEDURE — 99285 EMERGENCY DEPT VISIT HI MDM: CPT | Mod: 25 | Performed by: STUDENT IN AN ORGANIZED HEALTH CARE EDUCATION/TRAINING PROGRAM

## 2025-01-07 PROCEDURE — 74174 CTA ABD&PLVS W/CONTRAST: CPT

## 2025-01-07 PROCEDURE — 84484 ASSAY OF TROPONIN QUANT: CPT | Performed by: STUDENT IN AN ORGANIZED HEALTH CARE EDUCATION/TRAINING PROGRAM

## 2025-01-07 PROCEDURE — 96376 TX/PRO/DX INJ SAME DRUG ADON: CPT

## 2025-01-07 PROCEDURE — 83690 ASSAY OF LIPASE: CPT | Performed by: STUDENT IN AN ORGANIZED HEALTH CARE EDUCATION/TRAINING PROGRAM

## 2025-01-07 PROCEDURE — 2500000004 HC RX 250 GENERAL PHARMACY W/ HCPCS (ALT 636 FOR OP/ED): Mod: JZ | Performed by: STUDENT IN AN ORGANIZED HEALTH CARE EDUCATION/TRAINING PROGRAM

## 2025-01-07 PROCEDURE — 74174 CTA ABD&PLVS W/CONTRAST: CPT | Performed by: RADIOLOGY

## 2025-01-07 PROCEDURE — 93005 ELECTROCARDIOGRAM TRACING: CPT

## 2025-01-07 PROCEDURE — 36415 COLL VENOUS BLD VENIPUNCTURE: CPT | Performed by: STUDENT IN AN ORGANIZED HEALTH CARE EDUCATION/TRAINING PROGRAM

## 2025-01-07 PROCEDURE — 85025 COMPLETE CBC W/AUTO DIFF WBC: CPT | Performed by: STUDENT IN AN ORGANIZED HEALTH CARE EDUCATION/TRAINING PROGRAM

## 2025-01-07 RX ORDER — OMEPRAZOLE 20 MG/1
20 CAPSULE, DELAYED RELEASE ORAL DAILY
Qty: 30 CAPSULE | Refills: 0 | Status: SHIPPED | OUTPATIENT
Start: 2025-01-07 | End: 2025-02-06

## 2025-01-07 RX ORDER — DIPHENHYDRAMINE HYDROCHLORIDE 50 MG/ML
50 INJECTION INTRAMUSCULAR; INTRAVENOUS ONCE
Status: COMPLETED | OUTPATIENT
Start: 2025-01-07 | End: 2025-01-07

## 2025-01-07 RX ORDER — ONDANSETRON HYDROCHLORIDE 2 MG/ML
4 INJECTION, SOLUTION INTRAVENOUS ONCE
Status: COMPLETED | OUTPATIENT
Start: 2025-01-07 | End: 2025-01-07

## 2025-01-07 RX ADMIN — ONDANSETRON 4 MG: 2 INJECTION INTRAMUSCULAR; INTRAVENOUS at 09:52

## 2025-01-07 RX ADMIN — DIPHENHYDRAMINE HYDROCHLORIDE 50 MG: 50 INJECTION, SOLUTION INTRAMUSCULAR; INTRAVENOUS at 14:43

## 2025-01-07 RX ADMIN — METHYLPREDNISOLONE SODIUM SUCCINATE 40 MG: 40 INJECTION, POWDER, FOR SOLUTION INTRAMUSCULAR; INTRAVENOUS at 11:27

## 2025-01-07 RX ADMIN — IOHEXOL 100 ML: 350 INJECTION, SOLUTION INTRAVENOUS at 15:55

## 2025-01-07 RX ADMIN — METHYLPREDNISOLONE SODIUM SUCCINATE 40 MG: 40 INJECTION, POWDER, FOR SOLUTION INTRAMUSCULAR; INTRAVENOUS at 15:01

## 2025-01-07 ASSESSMENT — LIFESTYLE VARIABLES
EVER HAD A DRINK FIRST THING IN THE MORNING TO STEADY YOUR NERVES TO GET RID OF A HANGOVER: NO
HAVE PEOPLE ANNOYED YOU BY CRITICIZING YOUR DRINKING: NO
TOTAL SCORE: 0
HAVE YOU EVER FELT YOU SHOULD CUT DOWN ON YOUR DRINKING: NO
EVER FELT BAD OR GUILTY ABOUT YOUR DRINKING: NO

## 2025-01-07 ASSESSMENT — PAIN DESCRIPTION - LOCATION: LOCATION: ABDOMEN

## 2025-01-07 ASSESSMENT — COLUMBIA-SUICIDE SEVERITY RATING SCALE - C-SSRS
2. HAVE YOU ACTUALLY HAD ANY THOUGHTS OF KILLING YOURSELF?: NO
1. IN THE PAST MONTH, HAVE YOU WISHED YOU WERE DEAD OR WISHED YOU COULD GO TO SLEEP AND NOT WAKE UP?: NO
6. HAVE YOU EVER DONE ANYTHING, STARTED TO DO ANYTHING, OR PREPARED TO DO ANYTHING TO END YOUR LIFE?: NO

## 2025-01-07 ASSESSMENT — PAIN - FUNCTIONAL ASSESSMENT: PAIN_FUNCTIONAL_ASSESSMENT: 0-10

## 2025-01-07 ASSESSMENT — PAIN DESCRIPTION - PAIN TYPE: TYPE: ACUTE PAIN

## 2025-01-07 ASSESSMENT — PAIN DESCRIPTION - ORIENTATION: ORIENTATION: RIGHT

## 2025-01-07 ASSESSMENT — PAIN SCALES - GENERAL: PAINLEVEL_OUTOF10: 8

## 2025-01-07 ASSESSMENT — PAIN DESCRIPTION - DESCRIPTORS: DESCRIPTORS: ACHING

## 2025-01-07 NOTE — DISCHARGE INSTRUCTIONS
Medication as prescribed.  Follow-up with your GI doctor soon as possible.  Come back to the ED for any new or worsening symptoms.

## 2025-01-07 NOTE — ED PROVIDER NOTES
HPI   Chief Complaint   Patient presents with    Abdominal Pain    Rectal Bleeding       This is a 54-year-old female possible history of chronic back pain and abdominal pain with a known ventral hernia.  Who presents to the emergency department for abdominal pain and rectal bleeding.  Patient states that for the past 2 days she has had worsening abdominal pain and multiple episodes of which she believes is clots through her rectum when she has a bowel movement.  She states that she is usually abdominal pain but is never this intense.  She reports that the first day she had a fever but she has not had one since then she reports feeling nauseous no other issues otherwise.                          Wilfredo Coma Scale Score: 15                  Patient History   No past medical history on file.  Past Surgical History:   Procedure Laterality Date    EXPLORATORY LAPAROTOMY      OTHER SURGICAL HISTORY  02/23/2021    Gallbladder surgery    VENTRAL HERNIA REPAIR       No family history on file.  Social History     Tobacco Use    Smoking status: Never    Smokeless tobacco: Never   Vaping Use    Vaping status: Never Used   Substance Use Topics    Alcohol use: Never    Drug use: Never       Physical Exam   ED Triage Vitals [01/07/25 0837]   Temperature Heart Rate Respirations BP   36.7 °C (98.1 °F) 92 18 (!) 174/102      Pulse Ox Temp src Heart Rate Source Patient Position   95 % -- -- --      BP Location FiO2 (%)     -- --       Physical Exam  Constitutional:       General: She is not in acute distress.  HENT:      Head: Normocephalic and atraumatic.      Right Ear: Tympanic membrane normal.      Left Ear: Tympanic membrane normal.      Mouth/Throat:      Mouth: Mucous membranes are moist.   Eyes:      Extraocular Movements: Extraocular movements intact.      Conjunctiva/sclera: Conjunctivae normal.      Pupils: Pupils are equal, round, and reactive to light.   Cardiovascular:      Rate and Rhythm: Normal rate and regular  rhythm.      Heart sounds: No murmur heard.  Pulmonary:      Effort: Pulmonary effort is normal. No respiratory distress.      Breath sounds: Normal breath sounds. No stridor. No wheezing or rales.   Abdominal:      General: Bowel sounds are normal. There is no distension.      Tenderness: There is abdominal tenderness in the right upper quadrant and right lower quadrant. There is no guarding or rebound.   Musculoskeletal:         General: No swelling, tenderness or deformity. Normal range of motion.   Skin:     General: Skin is warm and dry.      Coloration: Skin is not jaundiced.      Findings: No bruising or lesion.   Neurological:      General: No focal deficit present.      Mental Status: She is alert and oriented to person, place, and time. Mental status is at baseline.      Cranial Nerves: No cranial nerve deficit.      Motor: No weakness.   Psychiatric:         Mood and Affect: Mood normal.       Labs Reviewed - No data to display  No orders to display       ED Course & MDM   Diagnoses as of 01/08/25 1302   Abdominal pain, generalized   Gastrointestinal hemorrhage, unspecified gastrointestinal hemorrhage type       Medical Decision Making  This is a 54-year-old female who presents to the emergency department for chronic back pain and abdominal pain she does have a visceral hernia.  On exam she has diffuse tenderness to palpation of her abdomen.  She is also complaining of bright red blood per rectum.  Her hemoglobin is remaining stable.  Her labs are otherwise reassuring no signs of infection.  She does have a contrast dye allergy she was getting the 4-hour prep.  She is signed out to oncoming provider pending CT of her abdomen.    EKG on my read shows sinus rhythm at a rate 85 bpm no ST elevations seen, normal intervals.        Procedure  Procedures     Nimisha Castro MD  01/08/25 1302       Nimisha Castro MD  01/28/25 0590

## 2025-01-07 NOTE — PROGRESS NOTES
Emergency Medicine Transition of Care Note.    I received Kacie Antoine in signout from Dr. Castro.  Please see the previous ED provider note for all HPI, PE and MDM up to the time of signout at 1600. This is in addition to the primary record.    In brief Kacie Antoine is an 54 y.o. female presenting for   Chief Complaint   Patient presents with    Abdominal Pain    Rectal Bleeding     At the time of signout we were awaiting: CTA abdomen pelvis    Diagnoses as of 01/07/25 9268   Abdominal pain, generalized   Gastrointestinal hemorrhage, unspecified gastrointestinal hemorrhage type       Medical Decision Making    54-year-old female presents ED with concerns for generalized abdominal pain and 2 episodes of reddish clot from her rectum.  This has been going on for last 2 days.  Workup until the time of signout was significant for no acute abnormalities on her abdominal labs.  At signout pending CT abdomen pelvis.  CT shows no significant abnormalities.  Patient is Bainbridge score low risk for GI bleed.  She is already established with a GI provider.  Therefore, with her workup otherwise stable will discharge patient home with instruction to follow-up with her GI doctor and primary care doctor.  Advised to come back to the ED for any new or worsening symptoms    Final diagnoses:   [R10.84] Abdominal pain, generalized   [K92.2] Gastrointestinal hemorrhage, unspecified gastrointestinal hemorrhage type           Procedure  Procedures    Von Yo DO

## 2025-01-07 NOTE — ED TRIAGE NOTES
"Pt. Arrived to the ED via [facility] for c/o abdominal pain and rectal bleeding. Pt. States that over the last 3 days she has had RUQ abdominal pain and has had rectal bleeding. Pt. States that she is having \"Jelly Like\" blood stools with intermittent cots. Pt. Has been having increased nauseous and has vomited twice. EMS gave 4mg IV Zofran prior to arrival. Pt. Has  a hx of abdominal surgery. Pt. States that 5 years ago she had a mesh lining placed in her abdomen   "

## 2025-01-16 LAB
ATRIAL RATE: 87 BPM
P AXIS: 54 DEGREES
PR INTERVAL: 147 MS
Q ONSET: 253 MS
QRS COUNT: 13 BEATS
QRS DURATION: 92 MS
QT INTERVAL: 364 MS
QTC CALCULATION(BAZETT): 433 MS
QTC FREDERICIA: 408 MS
R AXIS: 16 DEGREES
T AXIS: 33 DEGREES
T OFFSET: 435 MS
VENTRICULAR RATE: 85 BPM

## 2025-01-20 ENCOUNTER — OFFICE VISIT (OUTPATIENT)
Dept: URGENT CARE | Age: 55
End: 2025-01-20
Payer: COMMERCIAL

## 2025-01-20 VITALS
SYSTOLIC BLOOD PRESSURE: 116 MMHG | DIASTOLIC BLOOD PRESSURE: 88 MMHG | OXYGEN SATURATION: 98 % | RESPIRATION RATE: 16 BRPM | TEMPERATURE: 97.6 F | HEART RATE: 108 BPM

## 2025-01-20 DIAGNOSIS — H66.002 ACUTE SUPPURATIVE OTITIS MEDIA OF LEFT EAR WITHOUT SPONTANEOUS RUPTURE OF TYMPANIC MEMBRANE, RECURRENCE NOT SPECIFIED: ICD-10-CM

## 2025-01-20 DIAGNOSIS — J01.40 ACUTE PANSINUSITIS, RECURRENCE NOT SPECIFIED: Primary | ICD-10-CM

## 2025-01-20 DIAGNOSIS — R06.02 SHORTNESS OF BREATH: ICD-10-CM

## 2025-01-20 DIAGNOSIS — R06.2 WHEEZING: ICD-10-CM

## 2025-01-20 DIAGNOSIS — J45.901 EXACERBATION OF ASTHMA, UNSPECIFIED ASTHMA SEVERITY, UNSPECIFIED WHETHER PERSISTENT (HHS-HCC): ICD-10-CM

## 2025-01-20 RX ORDER — PREDNISONE 20 MG/1
40 TABLET ORAL DAILY
Qty: 10 TABLET | Refills: 0 | Status: SHIPPED | OUTPATIENT
Start: 2025-01-20 | End: 2025-01-25

## 2025-01-20 RX ORDER — DOXYCYCLINE HYCLATE 100 MG
100 TABLET ORAL 2 TIMES DAILY
Qty: 20 TABLET | Refills: 0 | Status: SHIPPED | OUTPATIENT
Start: 2025-01-20 | End: 2025-01-30

## 2025-01-20 RX ORDER — ALBUTEROL SULFATE 90 UG/1
INHALANT RESPIRATORY (INHALATION)
Qty: 8.5 G | Refills: 0 | Status: SHIPPED | OUTPATIENT
Start: 2025-01-20

## 2025-01-20 RX ORDER — ALBUTEROL SULFATE 0.83 MG/ML
2.5 SOLUTION RESPIRATORY (INHALATION) EVERY 6 HOURS PRN
Qty: 75 ML | Refills: 0 | Status: SHIPPED | OUTPATIENT
Start: 2025-01-20 | End: 2026-01-20

## 2025-01-20 RX ORDER — IPRATROPIUM BROMIDE AND ALBUTEROL SULFATE 2.5; .5 MG/3ML; MG/3ML
3 SOLUTION RESPIRATORY (INHALATION) ONCE
Status: COMPLETED | OUTPATIENT
Start: 2025-01-20 | End: 2025-01-20

## 2025-01-20 RX ADMIN — IPRATROPIUM BROMIDE AND ALBUTEROL SULFATE 3 ML: 2.5; .5 SOLUTION RESPIRATORY (INHALATION) at 10:40

## 2025-01-20 ASSESSMENT — ENCOUNTER SYMPTOMS
SORE THROAT: 0
SHORTNESS OF BREATH: 1
HEARTBURN: 0
MYALGIAS: 0
COUGH: 1
SWEATS: 0
CHILLS: 0
WEIGHT LOSS: 0
FEVER: 0
HEADACHES: 1
HEMOPTYSIS: 0
WHEEZING: 1
RHINORRHEA: 1

## 2025-01-20 NOTE — PATIENT INSTRUCTIONS
Take the antibiotic with food.  Eat yogurt or take probiotic once a day.  Symptoms should improve in 2 to 3 days.   Take prednisone as instructed for asthma exacerbation.  Take albuterol inhaler or nebulizer every 4-6 hours as needed.  Wash your hands often, especially after coughing or touching your nose or mouth.  Use disposable tissues instead of handkerchiefs.  Increase fluid intake and rest as needed.  Take Tylenol and/or ibuprofen as needed for aches and pain and/or fever.  Return or follow-up with primary care provider if symptoms did not improve.  Call 911 or go to the nearest emergency room if symptoms became severe such as fever of 102.5 degrees Fahrenheit or 39.2 degrees Celsius, severe pain, shortness of breath, chest tightness.   Patient verbalized understanding of the instructions and left in stable condition.     Consistent Carbohydrate Diabetic Diets

## 2025-01-20 NOTE — PROGRESS NOTES
Subjective   Patient ID: Kacie Antoine is a 54 y.o. female. They present today with a chief complaint of Cough (Cough, L ear pain) and Earache.    History of Present Illness     used: No    Cough  This is a new problem. Episode onset: 3-4 days ago. The problem has been gradually worsening. The problem occurs constantly. The cough is Productive of purulent sputum. Associated symptoms include ear pain, headaches, nasal congestion, postnasal drip, rhinorrhea, shortness of breath and wheezing. Pertinent negatives include no chest pain, chills, ear congestion, fever, heartburn, hemoptysis, myalgias, rash, sore throat, sweats or weight loss. The symptoms are aggravated by lying down. Treatments tried: albuterol inhaler. The treatment provided no relief. Her past medical history is significant for asthma.   Earache   Associated symptoms include coughing, headaches and rhinorrhea. Pertinent negatives include no rash or sore throat.       Past Medical History  Allergies as of 01/20/2025 - Reviewed 01/20/2025   Allergen Reaction Noted    Hydromorphone Itching and Swelling 03/28/2024    Sulfamethoxazole Unknown 03/28/2024    Codeine Rash 11/09/2015    Erythromycin Rash 11/09/2015    Iodinated contrast media Rash 03/28/2024    Iodine Rash 09/21/2017    Penicillins Rash 09/21/2017    Shellfish derived Rash 11/09/2015       (Not in a hospital admission)       History reviewed. No pertinent past medical history.    Past Surgical History:   Procedure Laterality Date    EXPLORATORY LAPAROTOMY      OTHER SURGICAL HISTORY  02/23/2021    Gallbladder surgery    VENTRAL HERNIA REPAIR          reports that she has never smoked. She has never used smokeless tobacco. She reports that she does not drink alcohol and does not use drugs.    Review of Systems  Review of Systems   Constitutional:  Negative for chills, fever and weight loss.   HENT:  Positive for ear pain, postnasal drip and rhinorrhea. Negative for sore  throat.    Respiratory:  Positive for cough, shortness of breath and wheezing. Negative for hemoptysis.    Cardiovascular:  Negative for chest pain.   Gastrointestinal:  Negative for heartburn.   Musculoskeletal:  Negative for myalgias.   Skin:  Negative for rash.   Neurological:  Positive for headaches.            Objective    Vitals:    01/20/25 1026   BP: 116/88   Pulse: 108   Resp: 16   Temp: 36.4 °C (97.6 °F)   TempSrc: Temporal   SpO2: 98%     No LMP recorded. Patient is postmenopausal.    Physical Exam  Vitals and nursing note reviewed.   Constitutional:       Appearance: Normal appearance.   HENT:      Head: Normocephalic and atraumatic.      Right Ear: Hearing, tympanic membrane, ear canal and external ear normal.      Left Ear: Hearing, ear canal and external ear normal. Tympanic membrane is erythematous.      Nose: Nasal tenderness, mucosal edema, congestion and rhinorrhea present. No nasal deformity, septal deviation, signs of injury or laceration. Rhinorrhea is purulent.      Right Sinus: Maxillary sinus tenderness and frontal sinus tenderness present.      Left Sinus: Maxillary sinus tenderness and frontal sinus tenderness present.      Mouth/Throat:      Lips: Pink.      Mouth: Mucous membranes are moist.      Pharynx: Uvula midline. Postnasal drip present.      Tonsils: No tonsillar exudate or tonsillar abscesses.   Cardiovascular:      Rate and Rhythm: Normal rate and regular rhythm.      Heart sounds: Normal heart sounds.   Pulmonary:      Effort: Pulmonary effort is normal.      Breath sounds: Normal air entry. Examination of the right-lower field reveals wheezing. Wheezing present.   Musculoskeletal:      Cervical back: Normal range of motion and neck supple.   Lymphadenopathy:      Cervical: No cervical adenopathy.   Neurological:      Mental Status: She is alert.   Psychiatric:         Mood and Affect: Mood normal.         Behavior: Behavior normal.         Procedures    Point of Care Test &  Imaging Results from this visit  No results found for this visit on 01/20/25.   No results found.    Diagnostic study results (if any) were reviewed by KELVIN Pan.    Assessment/Plan   Allergies, medications, history, and pertinent labs/EKGs/Imaging reviewed by KELVIN Pan.     Medical Decision Making  Testing: None  Differential: 1) uri, 2) sinusitis , 3) OM  Impression: Sinusitis, left OM, and asthma exacerbation  Treatment: Duoneb was administered during the visit for severe cough and wheezing. Take the antibiotic with food.  Eat yogurt or take probiotic once a day.  Symptoms should improve in 2 to 3 days.   Wash your hands often, especially after coughing or touching your nose or mouth.  Use disposable tissues instead of handkerchiefs.  Increase fluid intake and rest as needed.  Take Tylenol and/or ibuprofen as needed for aches and pain and/or fever.  Plan: Return or follow-up with primary care provider if symptoms did not improve.  Plan: Call 911 or go to the nearest emergency room if symptoms became severe such as fever of 102.5 degrees Fahrenheit or 39.2 degrees Celsius, severe pain, shortness of breath, chest tightness.   Patient verbalized understanding of the instructions and left in stable condition.      Orders and Diagnoses  Diagnoses and all orders for this visit:  Acute pansinusitis, recurrence not specified  -     doxycycline (Vibra-Tabs) 100 mg tablet; Take 1 tablet (100 mg) by mouth 2 times a day for 10 days. Take with a full glass of water and do not lie down for at least 30 minutes after.  Acute suppurative otitis media of left ear without spontaneous rupture of tympanic membrane, recurrence not specified  -     doxycycline (Vibra-Tabs) 100 mg tablet; Take 1 tablet (100 mg) by mouth 2 times a day for 10 days. Take with a full glass of water and do not lie down for at least 30 minutes after.  Wheezing  -     ipratropium-albuteroL (Duo-Neb) 0.5-2.5 mg/3 mL nebulizer solution 3  mL  -     albuterol 2.5 mg /3 mL (0.083 %) nebulizer solution; Take 3 mL (2.5 mg) by nebulization every 6 hours if needed for wheezing.  -     predniSONE (Deltasone) 20 mg tablet; Take 2 tablets (40 mg) by mouth once daily for 5 days.  -     albuterol (ProAir HFA) 90 mcg/actuation inhaler; Take 1-2 puffs every 4 to 6 hours as needed for cough and shortness of breath.  Exacerbation of asthma, unspecified asthma severity, unspecified whether persistent ([facility])  -     ipratropium-albuteroL (Duo-Neb) 0.5-2.5 mg/3 mL nebulizer solution 3 mL  -     albuterol 2.5 mg /3 mL (0.083 %) nebulizer solution; Take 3 mL (2.5 mg) by nebulization every 6 hours if needed for wheezing.  -     predniSONE (Deltasone) 20 mg tablet; Take 2 tablets (40 mg) by mouth once daily for 5 days.  -     albuterol (ProAir HFA) 90 mcg/actuation inhaler; Take 1-2 puffs every 4 to 6 hours as needed for cough and shortness of breath.  Shortness of breath  -     ipratropium-albuteroL (Duo-Neb) 0.5-2.5 mg/3 mL nebulizer solution 3 mL  -     albuterol 2.5 mg /3 mL (0.083 %) nebulizer solution; Take 3 mL (2.5 mg) by nebulization every 6 hours if needed for wheezing.  -     predniSONE (Deltasone) 20 mg tablet; Take 2 tablets (40 mg) by mouth once daily for 5 days.  -     albuterol (ProAir HFA) 90 mcg/actuation inhaler; Take 1-2 puffs every 4 to 6 hours as needed for cough and shortness of breath.      Medical Admin Record  Administrations This Visit       ipratropium-albuteroL (Duo-Neb) 0.5-2.5 mg/3 mL nebulizer solution 3 mL       Admin Date  01/20/2025 Action  Given Dose  3 mL Route  nebulization Documented By  Toya Tamayo RN                    Patient disposition: Home    Electronically signed by KELVIN Pan  10:46 AM